# Patient Record
Sex: FEMALE | Race: WHITE | NOT HISPANIC OR LATINO | Employment: FULL TIME | URBAN - METROPOLITAN AREA
[De-identification: names, ages, dates, MRNs, and addresses within clinical notes are randomized per-mention and may not be internally consistent; named-entity substitution may affect disease eponyms.]

---

## 2019-08-19 RX ORDER — PRAVASTATIN SODIUM 20 MG/1
20 TABLET ORAL DAILY
Qty: 90 TABLET | Refills: 0 | Status: SHIPPED | OUTPATIENT
Start: 2019-08-19 | End: 2020-01-20 | Stop reason: SDUPTHER

## 2019-08-19 RX ORDER — PRAVASTATIN SODIUM 20 MG/1
20 TABLET ORAL DAILY
COMMUNITY
End: 2019-08-19 | Stop reason: SDUPTHER

## 2019-08-19 NOTE — TELEPHONE ENCOUNTER
----- Message from Swapna Gaspar sent at 8/19/2019 10:17 AM CDT -----  Contact: April Shanti  Former pt. of provider at [facility] Pt. Is requesting refill for Pravastatin 20mg. Pt uses [facility] pharmacy on [address], pharmacy number [phone]. Pt best contact number is [phone].    Thank you,   [employer]

## 2019-09-16 ENCOUNTER — TELEPHONE (OUTPATIENT)
Dept: FAMILY MEDICINE | Facility: CLINIC | Age: 54
End: 2019-09-16

## 2019-09-16 NOTE — TELEPHONE ENCOUNTER
Patient aware, she needs appt last seen in May. She is scheduled for Oct. She should have 60 tablets remaining she is checking with [facility] now

## 2019-09-16 NOTE — TELEPHONE ENCOUNTER
----- Message from Swapna Gaspar sent at 9/16/2019 10:22 AM CDT -----  Regarding: Refill Request  Contact: [phone]  Good Morning,    This pt is requesting a refill on  Pravastatin 20mg .  Pt uses [facility] on [address], [place]. Pharmacy number [phone].  The pt's best contact number is [phone].     Thank you,   [facility]

## 2019-09-16 NOTE — TELEPHONE ENCOUNTER
----- Message from Swapna Gaspar sent at 9/16/2019 10:22 AM CDT -----  Regarding: Refill Request  Contact: [phone]  Good Morning,    This pt is requesting a refill on  Pravastatin 20mg .  Pt uses [facility] on [address], [place]. Pharmacy number [phone].  The pt's best contact number is [phone].     Thank you,   [facility] [employer]

## 2019-10-22 ENCOUNTER — OFFICE VISIT (OUTPATIENT)
Dept: FAMILY MEDICINE | Facility: CLINIC | Age: 54
End: 2019-10-22
Payer: COMMERCIAL

## 2019-10-22 VITALS
SYSTOLIC BLOOD PRESSURE: 142 MMHG | TEMPERATURE: 98 F | WEIGHT: 155.44 LBS | DIASTOLIC BLOOD PRESSURE: 90 MMHG | HEIGHT: 66 IN | BODY MASS INDEX: 24.98 KG/M2 | HEART RATE: 73 BPM | OXYGEN SATURATION: 99 %

## 2019-10-22 DIAGNOSIS — G43.909 MIGRAINE WITHOUT STATUS MIGRAINOSUS, NOT INTRACTABLE, UNSPECIFIED MIGRAINE TYPE: ICD-10-CM

## 2019-10-22 DIAGNOSIS — E78.2 HYPERLIPIDEMIA, MIXED: Primary | ICD-10-CM

## 2019-10-22 DIAGNOSIS — F33.0 MILD RECURRENT MAJOR DEPRESSION: ICD-10-CM

## 2019-10-22 DIAGNOSIS — M17.0 PRIMARY OSTEOARTHRITIS OF BOTH KNEES: ICD-10-CM

## 2019-10-22 DIAGNOSIS — Z00.00 ANNUAL PHYSICAL EXAM: ICD-10-CM

## 2019-10-22 DIAGNOSIS — F41.9 ANXIETY: ICD-10-CM

## 2019-10-22 PROCEDURE — 99214 PR OFFICE/OUTPT VISIT, EST, LEVL IV, 30-39 MIN: ICD-10-PCS | Mod: S$GLB,,, | Performed by: INTERNAL MEDICINE

## 2019-10-22 PROCEDURE — 99999 PR PBB SHADOW E&M-EST. PATIENT-LVL III: ICD-10-PCS | Mod: PBBFAC,,, | Performed by: INTERNAL MEDICINE

## 2019-10-22 PROCEDURE — 99999 PR PBB SHADOW E&M-EST. PATIENT-LVL III: CPT | Mod: PBBFAC,,, | Performed by: INTERNAL MEDICINE

## 2019-10-22 PROCEDURE — 99214 OFFICE O/P EST MOD 30 MIN: CPT | Mod: S$GLB,,, | Performed by: INTERNAL MEDICINE

## 2019-10-22 RX ORDER — CITALOPRAM 20 MG/1
20 TABLET, FILM COATED ORAL DAILY
Refills: 1 | COMMUNITY
Start: 2019-08-11 | End: 2020-01-20 | Stop reason: SDUPTHER

## 2019-10-22 RX ORDER — BUSPIRONE HYDROCHLORIDE 15 MG/1
15 TABLET ORAL 2 TIMES DAILY
Qty: 60 TABLET | Refills: 5 | Status: SHIPPED | OUTPATIENT
Start: 2019-10-22 | End: 2019-10-30

## 2019-10-22 NOTE — LETTER
October 22, 2019      [address], [address]  [place] [address]  Phone: [phone]  Fax: [phone]       Patient: Jane Moser   YOB: 1965  Date of Visit: 10/22/2019    To Whom It May Concern:    Wendy Moser  was at [facility] on 10/22/2019. She may return to work/school on 10/22/2019  with no restrictions. If you have any questions or concerns, or if I can be of further assistance, please do not hesitate to contact me.    Sincerely,        Clarke Almendarez MD

## 2019-10-22 NOTE — PROGRESS NOTES
[facility] Note    Chief Complaint      Chief Complaint   Patient presents with    Establish Care       History of Present Illness      Jane Moser is a 54 y.o. female with chronic conditions of HLD, migraines, osteoarthritis, knees, anxiety who presents today for: re-establish care from  and review chronic conditions.   HLD: Controlled on pravastatin.  LDL due.    Anxiety: Still significnat symptoms.  Tried increasing celexa but felt like a zombie.  Wants to try something else.  Osteoarthritis, knees: Needs refill of meloxicam.  Was doing well to control symptoms.  Taking 5x/week.  Previously followed by ortho who did not recommend surgery at that time.  Also saw Dr. Rose and offered knee injection if it gets severe.  Migraines: Stable frequency and severity.      Past Medical History:  History reviewed. No pertinent past medical history.    Past Surgical History:   has no past surgical history on file.    Family History:  family history includes Aneurysm in her father; No Known Problems in her brother, mother, and sister.     Social History:  Social History     Tobacco Use    Smoking status: Never Smoker   Substance Use Topics    Alcohol use: Yes     Alcohol/week: 1.0 standard drinks     Types: 1 Glasses of wine per week     Frequency: 2-4 times a month     Drinks per session: 1 or 2     Binge frequency: Never    Drug use: Never       Review of Systems   Constitutional: Negative for chills, fever and malaise/fatigue.   HENT: Negative for hearing loss.    Eyes: Negative for discharge.   Respiratory: Negative for shortness of breath and wheezing.    Cardiovascular: Negative for chest pain and palpitations.   Gastrointestinal: Positive for constipation. Negative for blood in stool, diarrhea, nausea and vomiting.   Genitourinary: Negative for dysuria and hematuria.   Musculoskeletal: Negative for neck pain.   Skin: Negative for rash.   Neurological: Positive for headaches. Negative for  "weakness.   Endo/Heme/Allergies: Negative for polydipsia.        Medications:  Outpatient Encounter Medications as of 10/22/2019   Medication Sig Dispense Refill    citalopram (CELEXA) 20 MG tablet Take 20 mg by mouth once daily.  1    pravastatin (PRAVACHOL) 20 MG tablet Take 1 tablet (20 mg total) by mouth once daily. 90 tablet 0    busPIRone (BUSPAR) 15 MG tablet Take 1 tablet (15 mg total) by mouth 2 (two) times daily. 60 tablet 5    meloxicam 15 mg TbDL        No facility-administered encounter medications on file as of 10/22/2019.        Allergies:  Review of patient's allergies indicates:   Allergen Reactions    Cefdinir Diarrhea and Nausea And Vomiting       Health Maintenance:    There is no immunization history on file for this patient.   Health Maintenance   Topic Date Due    Lipid Panel  1965    TETANUS VACCINE  04/16/1985    Pap Smear with HPV Cotest  04/16/1988    Mammogram  04/16/2007    Colonoscopy  04/16/2017      FLu shot scheduled next week.  Td 2005.  Shingles vaccine due age 62.  Pneumonia vaccine due age 67.  Mammogram UTD.  PAP smear UTD.  Established with Dr. Agarwal now.  DEXA due age 67.  Cscope 2017 with Dr. Enriquez, no polyps, 10 yr interval.    Physical Exam      Vital Signs  Temp: 98 °F (36.7 °C)  Temp src: Oral  Pulse: 73  SpO2: 99 %  BP: (!) 142/90  BP Location: Right arm  Patient Position: Sitting  Height and Weight  Height: 5' 6" (167.6 cm)  Weight: 70.5 kg (155 lb 6.8 oz)  BSA (Calculated - sq m): 1.81 sq meters  BMI (Calculated): 25.1  Weight in (lb) to have BMI = 25: 154.6]    Physical Exam   Constitutional: She appears well-developed and well-nourished.   HENT:   Head: Normocephalic and atraumatic.   Right Ear: External ear normal.   Left Ear: External ear normal.   Mouth/Throat: Oropharynx is clear and moist.   Eyes: Pupils are equal, round, and reactive to light. Conjunctivae and EOM are normal.   Neck: Carotid bruit is not present.   Cardiovascular: Normal " rate, regular rhythm, normal heart sounds and intact distal pulses.   No murmur heard.  Pulmonary/Chest: Effort normal and breath sounds normal. She has no wheezes. She has no rales.   Abdominal: Soft. Bowel sounds are normal. She exhibits no distension. There is no hepatosplenomegaly. There is no tenderness.   Musculoskeletal: She exhibits no edema.   Vitals reviewed.       Laboratory:  CBC:      CMP:        Invalid input(s): CREATININ  URINALYSIS:       LIPIDS:      TSH:      A1C:        Assessment/Plan     April Shanti is a 54 y.o.female with:    1. Hyperlipidemia, mixed  - Comprehensive metabolic panel; Future  - Lipid panel; Future  - CBC auto differential; Future  - TSH; Future  - T4, free; Future  - Comprehensive metabolic panel  - Lipid panel  - CBC auto differential  - TSH  - T4, free    2. Mild recurrent major depression    3. Anxiety  - busPIRone (BUSPAR) 15 MG tablet; Take 1 tablet (15 mg total) by mouth 2 (two) times daily.  Dispense: 60 tablet; Refill: 5  - Comprehensive metabolic panel; Future  - Lipid panel; Future  - CBC auto differential; Future  - TSH; Future  - T4, free; Future  - Comprehensive metabolic panel  - Lipid panel  - CBC auto differential  - TSH  - T4, free    4. Primary osteoarthritis of both knees    5. Migraine without status migrainosus, not intractable, unspecified migraine type    6. Annual physical exam  - Comprehensive metabolic panel; Future  - Lipid panel; Future  - CBC auto differential; Future  - TSH; Future  - T4, free; Future  - Comprehensive metabolic panel  - Lipid panel  - CBC auto differential  - TSH  - T4, free       Chronic conditions status updated as per HPI.  Other than changes above, cont current medications and maintain follow up with specialists.  Return to clinic in 6 months.    Clarke Almendarez MD  [facility]                Answers for HPI/ROS submitted by the patient on 10/17/2019   activity change: No  unexpected weight change: No  rhinorrhea:  No  trouble swallowing: No  visual disturbance: No  chest tightness: No  polyuria: No  difficulty urinating: No  menstrual problem: No  joint swelling: No  arthralgias: Yes  confusion: No  dysphoric mood: No

## 2019-10-29 ENCOUNTER — PATIENT MESSAGE (OUTPATIENT)
Dept: FAMILY MEDICINE | Facility: CLINIC | Age: 54
End: 2019-10-29

## 2019-10-31 DIAGNOSIS — M17.0 PRIMARY OSTEOARTHRITIS OF BOTH KNEES: Primary | ICD-10-CM

## 2019-11-05 ENCOUNTER — CLINICAL SUPPORT (OUTPATIENT)
Dept: FAMILY MEDICINE | Facility: CLINIC | Age: 54
End: 2019-11-05
Payer: COMMERCIAL

## 2019-11-05 VITALS — DIASTOLIC BLOOD PRESSURE: 90 MMHG | SYSTOLIC BLOOD PRESSURE: 140 MMHG

## 2019-11-05 DIAGNOSIS — I10 ESSENTIAL HYPERTENSION: Primary | ICD-10-CM

## 2019-11-05 PROCEDURE — 99999 PR PBB SHADOW E&M-EST. PATIENT-LVL I: CPT | Mod: PBBFAC,,,

## 2019-11-05 PROCEDURE — 99999 PR PBB SHADOW E&M-EST. PATIENT-LVL I: ICD-10-PCS | Mod: PBBFAC,,,

## 2019-11-05 RX ORDER — AMLODIPINE BESYLATE 5 MG/1
5 TABLET ORAL DAILY
Qty: 30 TABLET | Refills: 3 | Status: SHIPPED | OUTPATIENT
Start: 2019-11-05 | End: 2020-03-11 | Stop reason: SDUPTHER

## 2019-11-05 NOTE — PROGRESS NOTES
Patient in office this morning for BP Check(140 90) patient report she is not on no BP meds patient was seen on 10/15 with Dr Almendarez BP was 142/90 in office she was told to come back for nurse visit for BP Check. Patient states her BP at home be in the 140s/90s.

## 2019-11-17 ENCOUNTER — PATIENT MESSAGE (OUTPATIENT)
Dept: FAMILY MEDICINE | Facility: CLINIC | Age: 54
End: 2019-11-17

## 2019-11-19 ENCOUNTER — CLINICAL SUPPORT (OUTPATIENT)
Dept: FAMILY MEDICINE | Facility: CLINIC | Age: 54
End: 2019-11-19
Payer: COMMERCIAL

## 2019-11-19 VITALS — HEART RATE: 71 BPM | SYSTOLIC BLOOD PRESSURE: 108 MMHG | DIASTOLIC BLOOD PRESSURE: 70 MMHG

## 2019-11-19 PROCEDURE — 99999 PR PBB SHADOW E&M-EST. PATIENT-LVL II: CPT | Mod: PBBFAC,,,

## 2019-11-19 PROCEDURE — 99999 PR PBB SHADOW E&M-EST. PATIENT-LVL II: ICD-10-PCS | Mod: PBBFAC,,,

## 2019-11-20 ENCOUNTER — TELEPHONE (OUTPATIENT)
Dept: FAMILY MEDICINE | Facility: CLINIC | Age: 54
End: 2019-11-20

## 2019-12-06 ENCOUNTER — TELEPHONE (OUTPATIENT)
Dept: ADMINISTRATIVE | Facility: HOSPITAL | Age: 54
End: 2019-12-06

## 2019-12-06 ENCOUNTER — PATIENT OUTREACH (OUTPATIENT)
Dept: ADMINISTRATIVE | Facility: HOSPITAL | Age: 54
End: 2019-12-06

## 2020-01-19 ENCOUNTER — PATIENT MESSAGE (OUTPATIENT)
Dept: FAMILY MEDICINE | Facility: CLINIC | Age: 55
End: 2020-01-19

## 2020-01-20 RX ORDER — PRAVASTATIN SODIUM 20 MG/1
20 TABLET ORAL DAILY
Qty: 90 TABLET | Refills: 3 | Status: SHIPPED | OUTPATIENT
Start: 2020-01-20 | End: 2021-01-25

## 2020-01-20 RX ORDER — CITALOPRAM 20 MG/1
20 TABLET, FILM COATED ORAL DAILY
Qty: 90 TABLET | Refills: 3 | Status: SHIPPED | OUTPATIENT
Start: 2020-01-20 | End: 2021-06-13 | Stop reason: SDUPTHER

## 2020-03-11 DIAGNOSIS — I10 ESSENTIAL HYPERTENSION: ICD-10-CM

## 2020-03-11 RX ORDER — AMLODIPINE BESYLATE 5 MG/1
5 TABLET ORAL DAILY
Qty: 90 TABLET | Refills: 3 | Status: SHIPPED | OUTPATIENT
Start: 2020-03-11 | End: 2021-02-26

## 2020-04-22 ENCOUNTER — OFFICE VISIT (OUTPATIENT)
Dept: FAMILY MEDICINE | Facility: CLINIC | Age: 55
End: 2020-04-22
Payer: COMMERCIAL

## 2020-04-22 DIAGNOSIS — M17.0 PRIMARY OSTEOARTHRITIS OF BOTH KNEES: ICD-10-CM

## 2020-04-22 DIAGNOSIS — G43.909 MIGRAINE WITHOUT STATUS MIGRAINOSUS, NOT INTRACTABLE, UNSPECIFIED MIGRAINE TYPE: ICD-10-CM

## 2020-04-22 DIAGNOSIS — F41.9 ANXIETY: ICD-10-CM

## 2020-04-22 DIAGNOSIS — Z00.00 ANNUAL PHYSICAL EXAM: ICD-10-CM

## 2020-04-22 DIAGNOSIS — E78.2 HYPERLIPIDEMIA, MIXED: ICD-10-CM

## 2020-04-22 DIAGNOSIS — I10 ESSENTIAL HYPERTENSION: Primary | ICD-10-CM

## 2020-04-22 PROCEDURE — 99214 PR OFFICE/OUTPT VISIT, EST, LEVL IV, 30-39 MIN: ICD-10-PCS | Mod: 95,,, | Performed by: INTERNAL MEDICINE

## 2020-04-22 PROCEDURE — 99214 OFFICE O/P EST MOD 30 MIN: CPT | Mod: 95,,, | Performed by: INTERNAL MEDICINE

## 2020-04-22 NOTE — PROGRESS NOTES
[facility] Virtual Visit Note    The patient location is: home  The chief complaint leading to consultation is: follow up chronic conditions  Visit type: Virtual visit with synchronous audio and video  Total time spent with patient: 20 min  Each patient to whom he or she provides medical services by telemedicine is:  (1) informed of the relationship between the physician and patient and the respective role of any other health care provider with respect to management of the patient; and (2) notified that he or she may decline to receive medical services by telemedicine and may withdraw from such care at any time.      Chief Complaint      Chief Complaint   Patient presents with    Follow-up       History of Present Illness      April Shanti is a 55 y.o. female with chronic conditions of HTN, HLD, anxiety, osteoarthritis, migraine who presents today for: follow up chronic conditions.  HTN: BP at goal on amlodipine.  BP this morning 100/68.  HLD: Controlled on pravastatin.    Anxiety: Doing well on celexa.  Osteoarthritis, knees: Doing well on meloxicam as needed.  Symptoms not bothering her at this time.  Also saw Dr. Rose and offered knee injection if it gets severe.  Migraines: Slight increase in frequency 2/2 COVID stress.  Flu shot UTD.  Td 2005.  Shingles vaccine due age 62.  Pneumonia vaccine due age 67.  Mammogram UTD.  PAP smear UTD.  Scheduled with Dr. Agarwal.  DEXA due age 67.  Cscope 2017 with Dr. Enriquez, no polyps, 10 yr interval.    Past Medical History:  History reviewed. No pertinent past medical history.    Past Surgical History:   has a past surgical history that includes colonosocpy (04/18/2017).    Family History:  family history includes Aneurysm in her father; No Known Problems in her brother, mother, and sister.     Social History:  Social History     Tobacco Use    Smoking status: Never Smoker   Substance Use Topics    Alcohol use: Yes     Alcohol/week: 1.0 standard  drinks     Types: 1 Glasses of wine per week     Frequency: 2-4 times a month     Drinks per session: 1 or 2     Binge frequency: Never    Drug use: Never       Review of Systems   Constitutional: Negative for chills, fever and malaise/fatigue.   HENT: Negative for hearing loss.    Eyes: Negative for discharge.   Respiratory: Negative for shortness of breath and wheezing.    Cardiovascular: Negative for chest pain and palpitations.   Gastrointestinal: Negative for blood in stool, constipation, diarrhea, nausea and vomiting.   Genitourinary: Negative for dysuria and hematuria.   Musculoskeletal: Negative for neck pain.   Skin: Negative for rash.   Neurological: Negative for weakness and headaches.   Endo/Heme/Allergies: Negative for polydipsia.        Medications:  Outpatient Encounter Medications as of 4/22/2020   Medication Sig Dispense Refill    amLODIPine (NORVASC) 5 MG tablet Take 1 tablet (5 mg total) by mouth once daily. 90 tablet 3    citalopram (CELEXA) 20 MG tablet Take 1 tablet (20 mg total) by mouth once daily. 90 tablet 3    FLUCELVAX QUAD 3227-5818, PF, 60 mcg (15 mcg x 4)/0.5 mL Syrg TO BE ADMINISTERED BY PHARMACIST FOR IMMUNIZATION  0    pravastatin (PRAVACHOL) 20 MG tablet Take 1 tablet (20 mg total) by mouth once daily. 90 tablet 3     No facility-administered encounter medications on file as of 4/22/2020.        Allergies:  Review of patient's allergies indicates:   Allergen Reactions    Cefdinir Diarrhea and Nausea And Vomiting       Health Maintenance:  Immunization History   Administered Date(s) Administered    Influenza 10/23/2019    Influenza - Quadrivalent - MDCK - PF 10/23/2019      Health Maintenance   Topic Date Due    Lipid Panel  1965    TETANUS VACCINE  04/16/1985    Pap Smear with HPV Cotest  04/16/1988    Mammogram  05/31/2021    Colonoscopy  04/18/2027        Physical Exam       ]    Physical Exam   Constitutional: She is oriented to person, place, and time. She  appears well-developed and well-nourished. No distress.   Eyes: Conjunctivae and EOM are normal. Right eye exhibits no discharge. Left eye exhibits no discharge. No scleral icterus.   Pulmonary/Chest: Effort normal. No respiratory distress.   Neurological: She is alert and oriented to person, place, and time.   Skin: She is not diaphoretic.   Psychiatric: She has a normal mood and affect. Her behavior is normal. Judgment and thought content normal.        Laboratory:  CBC:      CMP:        Invalid input(s): CREATININ  URINALYSIS:       LIPIDS:      TSH:      A1C:        Assessment/Plan     April Shanti is a 55 y.o.female with:    1. Essential hypertension  - CBC auto differential; Future  - Comprehensive metabolic panel; Future  - Lipid panel; Future  - TSH; Future  - T4, free; Future  Continue current meds.    2. Hyperlipidemia, mixed  - CBC auto differential; Future  - Comprehensive metabolic panel; Future  - Lipid panel; Future  - TSH; Future  - T4, free; Future  Continue current meds.  Update labs.  3. Migraine without status migrainosus, not intractable, unspecified migraine type  Continue current meds.    4. Anxiety  Cont to monitor  5. Primary osteoarthritis of both knees  Continue current meds.      Chronic conditions status updated as per HPI.  Other than changes above, cont current medications and maintain follow up with specialists.  Return to clinic in 6 months.    Clarke Almendarez MD  [facility]                Answers for HPI/ROS submitted by the patient on 4/15/2020   activity change: No  unexpected weight change: No  rhinorrhea: No  trouble swallowing: No  visual disturbance: No  chest tightness: No  polyuria: No  difficulty urinating: No  menstrual problem: No  joint swelling: No  arthralgias: No  confusion: No  dysphoric mood: No

## 2020-04-25 LAB
ALBUMIN SERPL-MCNC: 4.5 G/DL (ref 3.6–5.1)
ALBUMIN/GLOB SERPL: 1.7 (CALC) (ref 1–2.5)
ALP SERPL-CCNC: 88 U/L (ref 37–153)
ALT SERPL-CCNC: 9 U/L (ref 6–29)
AST SERPL-CCNC: 15 U/L (ref 10–35)
BASOPHILS # BLD AUTO: 50 CELLS/UL (ref 0–200)
BASOPHILS NFR BLD AUTO: 0.8 %
BILIRUB SERPL-MCNC: 1 MG/DL (ref 0.2–1.2)
BUN SERPL-MCNC: 15 MG/DL (ref 7–25)
BUN/CREAT SERPL: NORMAL (CALC) (ref 6–22)
CALCIUM SERPL-MCNC: 9.7 MG/DL (ref 8.6–10.4)
CHLORIDE SERPL-SCNC: 104 MMOL/L (ref 98–110)
CHOLEST SERPL-MCNC: 209 MG/DL
CHOLEST/HDLC SERPL: 3.3 (CALC)
CO2 SERPL-SCNC: 30 MMOL/L (ref 20–32)
CREAT SERPL-MCNC: 0.98 MG/DL (ref 0.5–1.05)
EOSINOPHIL # BLD AUTO: 242 CELLS/UL (ref 15–500)
EOSINOPHIL NFR BLD AUTO: 3.9 %
ERYTHROCYTE [DISTWIDTH] IN BLOOD BY AUTOMATED COUNT: 12.8 % (ref 11–15)
GFRSERPLBLD MDRD-ARVRAT: 66 ML/MIN/1.73M2
GLOBULIN SER CALC-MCNC: 2.6 G/DL (CALC) (ref 1.9–3.7)
GLUCOSE SERPL-MCNC: 92 MG/DL (ref 65–99)
HCT VFR BLD AUTO: 39 % (ref 35–45)
HDLC SERPL-MCNC: 63 MG/DL
HGB BLD-MCNC: 13.2 G/DL (ref 11.7–15.5)
LDLC SERPL CALC-MCNC: 116 MG/DL (CALC)
LYMPHOCYTES # BLD AUTO: 2089 CELLS/UL (ref 850–3900)
LYMPHOCYTES NFR BLD AUTO: 33.7 %
MCH RBC QN AUTO: 29.7 PG (ref 27–33)
MCHC RBC AUTO-ENTMCNC: 33.8 G/DL (ref 32–36)
MCV RBC AUTO: 87.6 FL (ref 80–100)
MONOCYTES # BLD AUTO: 676 CELLS/UL (ref 200–950)
MONOCYTES NFR BLD AUTO: 10.9 %
NEUTROPHILS # BLD AUTO: 3143 CELLS/UL (ref 1500–7800)
NEUTROPHILS NFR BLD AUTO: 50.7 %
NONHDLC SERPL-MCNC: 146 MG/DL (CALC)
PLATELET # BLD AUTO: 289 THOUSAND/UL (ref 140–400)
PMV BLD REES-ECKER: 9.9 FL (ref 7.5–12.5)
POTASSIUM SERPL-SCNC: 4.1 MMOL/L (ref 3.5–5.3)
PROT SERPL-MCNC: 7.1 G/DL (ref 6.1–8.1)
RBC # BLD AUTO: 4.45 MILLION/UL (ref 3.8–5.1)
SODIUM SERPL-SCNC: 141 MMOL/L (ref 135–146)
T4 FREE SERPL-MCNC: 1 NG/DL (ref 0.8–1.8)
TRIGL SERPL-MCNC: 189 MG/DL
TSH SERPL-ACNC: 2.29 MIU/L
WBC # BLD AUTO: 6.2 THOUSAND/UL (ref 3.8–10.8)

## 2020-04-28 ENCOUNTER — TELEPHONE (OUTPATIENT)
Dept: ADMINISTRATIVE | Facility: HOSPITAL | Age: 55
End: 2020-04-28

## 2020-04-28 ENCOUNTER — PATIENT OUTREACH (OUTPATIENT)
Dept: ADMINISTRATIVE | Facility: HOSPITAL | Age: 55
End: 2020-04-28

## 2020-05-06 ENCOUNTER — TELEPHONE (OUTPATIENT)
Dept: ADMINISTRATIVE | Facility: HOSPITAL | Age: 55
End: 2020-05-06

## 2020-05-06 ENCOUNTER — PATIENT OUTREACH (OUTPATIENT)
Dept: ADMINISTRATIVE | Facility: HOSPITAL | Age: 55
End: 2020-05-06

## 2020-08-27 ENCOUNTER — TELEPHONE (OUTPATIENT)
Dept: FAMILY MEDICINE | Facility: CLINIC | Age: 55
End: 2020-08-27

## 2020-08-27 NOTE — TELEPHONE ENCOUNTER
----- Message from Anahy Francois sent at 8/27/2020 11:13 AM CDT -----  Type:  Patient Returning Call    Who Called: April  Who Left Message for Patient: unknown  Does the patient know what this is regarding?: appt in October  Would the patient rather a call back or a response via [employer]? Call back  Best Call Back Number: [phone]  Additional Information: none

## 2020-10-16 LAB
ALBUMIN SERPL-MCNC: 4.6 G/DL (ref 3.6–5.1)
ALBUMIN/GLOB SERPL: 1.8 (CALC) (ref 1–2.5)
ALP SERPL-CCNC: 87 U/L (ref 37–153)
ALT SERPL-CCNC: 12 U/L (ref 6–29)
AST SERPL-CCNC: 16 U/L (ref 10–35)
BASOPHILS # BLD AUTO: 62 CELLS/UL (ref 0–200)
BASOPHILS NFR BLD AUTO: 1 %
BILIRUB SERPL-MCNC: 1.5 MG/DL (ref 0.2–1.2)
BUN SERPL-MCNC: 16 MG/DL (ref 7–25)
BUN/CREAT SERPL: ABNORMAL (CALC) (ref 6–22)
CALCIUM SERPL-MCNC: 9.7 MG/DL (ref 8.6–10.4)
CHLORIDE SERPL-SCNC: 105 MMOL/L (ref 98–110)
CHOLEST SERPL-MCNC: 189 MG/DL
CHOLEST/HDLC SERPL: 3 (CALC)
CO2 SERPL-SCNC: 28 MMOL/L (ref 20–32)
CREAT SERPL-MCNC: 0.95 MG/DL (ref 0.5–1.05)
EOSINOPHIL # BLD AUTO: 211 CELLS/UL (ref 15–500)
EOSINOPHIL NFR BLD AUTO: 3.4 %
ERYTHROCYTE [DISTWIDTH] IN BLOOD BY AUTOMATED COUNT: 13.1 % (ref 11–15)
GFRSERPLBLD MDRD-ARVRAT: 68 ML/MIN/1.73M2
GLOBULIN SER CALC-MCNC: 2.6 G/DL (CALC) (ref 1.9–3.7)
GLUCOSE SERPL-MCNC: 93 MG/DL (ref 65–99)
HCT VFR BLD AUTO: 38.8 % (ref 35–45)
HDLC SERPL-MCNC: 62 MG/DL
HGB BLD-MCNC: 12.7 G/DL (ref 11.7–15.5)
LDLC SERPL CALC-MCNC: 102 MG/DL (CALC)
LYMPHOCYTES # BLD AUTO: 1773 CELLS/UL (ref 850–3900)
LYMPHOCYTES NFR BLD AUTO: 28.6 %
MCH RBC QN AUTO: 29.1 PG (ref 27–33)
MCHC RBC AUTO-ENTMCNC: 32.7 G/DL (ref 32–36)
MCV RBC AUTO: 88.8 FL (ref 80–100)
MONOCYTES # BLD AUTO: 744 CELLS/UL (ref 200–950)
MONOCYTES NFR BLD AUTO: 12 %
NEUTROPHILS # BLD AUTO: 3410 CELLS/UL (ref 1500–7800)
NEUTROPHILS NFR BLD AUTO: 55 %
NONHDLC SERPL-MCNC: 127 MG/DL (CALC)
PLATELET # BLD AUTO: 267 THOUSAND/UL (ref 140–400)
PMV BLD REES-ECKER: 10.3 FL (ref 7.5–12.5)
POTASSIUM SERPL-SCNC: 4.1 MMOL/L (ref 3.5–5.3)
PROT SERPL-MCNC: 7.2 G/DL (ref 6.1–8.1)
RBC # BLD AUTO: 4.37 MILLION/UL (ref 3.8–5.1)
SODIUM SERPL-SCNC: 141 MMOL/L (ref 135–146)
T4 FREE SERPL-MCNC: 1.2 NG/DL (ref 0.8–1.8)
TRIGL SERPL-MCNC: 155 MG/DL
TSH SERPL-ACNC: 1.92 MIU/L
WBC # BLD AUTO: 6.2 THOUSAND/UL (ref 3.8–10.8)

## 2020-10-21 ENCOUNTER — TELEPHONE (OUTPATIENT)
Dept: ADMINISTRATIVE | Facility: HOSPITAL | Age: 55
End: 2020-10-21

## 2020-10-21 ENCOUNTER — OFFICE VISIT (OUTPATIENT)
Dept: FAMILY MEDICINE | Facility: CLINIC | Age: 55
End: 2020-10-21
Payer: COMMERCIAL

## 2020-10-21 VITALS
WEIGHT: 155.88 LBS | BODY MASS INDEX: 25.05 KG/M2 | TEMPERATURE: 98 F | SYSTOLIC BLOOD PRESSURE: 122 MMHG | OXYGEN SATURATION: 98 % | HEART RATE: 65 BPM | HEIGHT: 66 IN | DIASTOLIC BLOOD PRESSURE: 70 MMHG

## 2020-10-21 DIAGNOSIS — E78.2 HYPERLIPIDEMIA, MIXED: ICD-10-CM

## 2020-10-21 DIAGNOSIS — M17.0 PRIMARY OSTEOARTHRITIS OF BOTH KNEES: ICD-10-CM

## 2020-10-21 DIAGNOSIS — G43.909 MIGRAINE WITHOUT STATUS MIGRAINOSUS, NOT INTRACTABLE, UNSPECIFIED MIGRAINE TYPE: ICD-10-CM

## 2020-10-21 DIAGNOSIS — F41.9 ANXIETY: ICD-10-CM

## 2020-10-21 DIAGNOSIS — Z00.00 ANNUAL PHYSICAL EXAM: Primary | ICD-10-CM

## 2020-10-21 DIAGNOSIS — M85.80 OSTEOPENIA, UNSPECIFIED LOCATION: ICD-10-CM

## 2020-10-21 DIAGNOSIS — I10 ESSENTIAL HYPERTENSION: ICD-10-CM

## 2020-10-21 PROCEDURE — 99396 PR PREVENTIVE VISIT,EST,40-64: ICD-10-PCS | Mod: S$GLB,,, | Performed by: INTERNAL MEDICINE

## 2020-10-21 PROCEDURE — 99396 PREV VISIT EST AGE 40-64: CPT | Mod: S$GLB,,, | Performed by: INTERNAL MEDICINE

## 2020-10-21 PROCEDURE — 99999 PR PBB SHADOW E&M-EST. PATIENT-LVL IV: ICD-10-PCS | Mod: PBBFAC,,, | Performed by: INTERNAL MEDICINE

## 2020-10-21 PROCEDURE — 99999 PR PBB SHADOW E&M-EST. PATIENT-LVL IV: CPT | Mod: PBBFAC,,, | Performed by: INTERNAL MEDICINE

## 2020-10-21 RX ORDER — DOXYCYCLINE 100 MG/1
CAPSULE ORAL
COMMUNITY
Start: 2020-10-20 | End: 2023-07-10

## 2020-10-21 RX ORDER — ERGOCALCIFEROL 1.25 MG/1
CAPSULE ORAL
COMMUNITY
Start: 2020-08-21 | End: 2023-02-13 | Stop reason: SDUPTHER

## 2020-10-21 NOTE — PROGRESS NOTES
[facility] Note    Chief Complaint      Chief Complaint   Patient presents with    Annual Exam       History of Present Illness      Jane Moser is a 55 y.o. female with chronic conditions of HTN, HLD, anxiety, osteaorthritis, migraines who presents today for: annual preventative visit.  Currently on doxycycline from Dr. Agarwal for skin abscess in groin.  HTN: BP at goal on amlodipine.   HLD: Controlled on pravastatin.    Anxiety: Doing well on celexa. No new or worsening symptoms.    Osteoarthritis, knees: Doing well on meloxicam as needed.  Does have some soreness with walking/hiking.  Will be calling Dr. Heaton for injection.  Migraines: Doing well.  No recent exacerbation  Diet: Prepares own food mostly.  Limiting fatty foods and carbs.  Drinks plenty water  Exercise: Walks regularly.    Denies drinking and driving, drinking more than 4 drinks on occasion, drug use.     Flu shot UTD.  TdAP due but on doxycycline so will defer today.  Shingles vaccine discussed.  Pneumonia vaccine due age 67.  Mammogram UTD 2020.  PAP smear UTD 2019.  Scheduled with Dr. Agarwal.  DEXA 2020, osteopenia, established with Dr. Mondragon.  Cscope 2017 with Dr. Enriquez, no polyps, 10 yr interval.    Past Medical History:  History reviewed. No pertinent past medical history.    Past Surgical History:   has a past surgical history that includes colonosocpy (04/18/2017).    Family History:  family history includes Aneurysm in her father; No Known Problems in her brother, mother, and sister.     Social History:  Social History     Tobacco Use    Smoking status: Never Smoker   Substance Use Topics    Alcohol use: Yes     Alcohol/week: 1.0 standard drinks     Types: 1 Glasses of wine per week     Frequency: 2-4 times a month     Drinks per session: 1 or 2     Binge frequency: Never    Drug use: Never       I personally reviewed all past medical, surgical, social and family history.    Review of Systems  "  Constitutional: Negative for chills, fever and malaise/fatigue.   Respiratory: Negative for shortness of breath.    Cardiovascular: Negative for chest pain.   Gastrointestinal: Negative for constipation, diarrhea, nausea and vomiting.   Skin: Negative for rash.   Neurological: Negative for weakness.   All other systems reviewed and are negative.       Medications:  Outpatient Encounter Medications as of 10/21/2020   Medication Sig Dispense Refill    amLODIPine (NORVASC) 5 MG tablet Take 1 tablet (5 mg total) by mouth once daily. 90 tablet 3    citalopram (CELEXA) 20 MG tablet Take 1 tablet (20 mg total) by mouth once daily. 90 tablet 3    doxycycline (VIBRAMYCIN) 100 MG Cap       ergocalciferol (ERGOCALCIFEROL) 50,000 unit Cap TAKE ONE PILL ONCE A MONTH      pravastatin (PRAVACHOL) 20 MG tablet Take 1 tablet (20 mg total) by mouth once daily. 90 tablet 3    FLUCELVAX QUAD 8096-2724, PF, 60 mcg (15 mcg x 4)/0.5 mL Syrg TO BE ADMINISTERED BY PHARMACIST FOR IMMUNIZATION  0     No facility-administered encounter medications on file as of 10/21/2020.        Allergies:  Review of patient's allergies indicates:   Allergen Reactions    Cefdinir Diarrhea and Nausea And Vomiting       Health Maintenance:  Immunization History   Administered Date(s) Administered    Influenza 10/23/2019, 09/26/2020    Influenza - Quadrivalent - MDCK - PF 10/23/2019    Influenza - Quadrivalent - PF *Preferred* (6 months and older) 09/26/2020      Health Maintenance   Topic Date Due    Hepatitis C Screening  1965    TETANUS VACCINE  04/16/1985    Mammogram  05/31/2021    Lipid Panel  10/15/2025        Physical Exam      Vital Signs  Temp: 97.5 °F (36.4 °C)  Temp src: Oral  Pulse: 65  SpO2: 98 %  BP: 122/70  BP Location: Left arm  Patient Position: Sitting  Pain Score: 0-No pain  Height and Weight  Height: 5' 6" (167.6 cm)  Weight: 70.7 kg (155 lb 13.8 oz)  BSA (Calculated - sq m): 1.81 sq meters  BMI (Calculated): " 25.2  Weight in (lb) to have BMI = 25: 154.6]    Physical Exam  Vitals signs reviewed.   Constitutional:       Appearance: She is well-developed.   HENT:      Head: Normocephalic and atraumatic.      Right Ear: External ear normal.      Left Ear: External ear normal.   Eyes:      Conjunctiva/sclera: Conjunctivae normal.      Pupils: Pupils are equal, round, and reactive to light.   Neck:      Vascular: No carotid bruit.   Cardiovascular:      Rate and Rhythm: Normal rate and regular rhythm.      Heart sounds: Normal heart sounds. No murmur.   Pulmonary:      Effort: Pulmonary effort is normal.      Breath sounds: Normal breath sounds. No wheezing or rales.   Abdominal:      General: Bowel sounds are normal. There is no distension.      Palpations: Abdomen is soft.      Tenderness: There is no abdominal tenderness.          Laboratory:  CBC:  Recent Labs   Lab 04/24/20  0736 10/15/20  0734   WBC 6.2 6.2   RBC 4.45 4.37   Hemoglobin 13.2 12.7   Hematocrit 39.0 38.8   Platelets 289 267   Mean Corpuscular Volume 87.6 88.8   Mean Corpuscular Hemoglobin 29.7 29.1   Mean Corpuscular Hemoglobin Conc 33.8 32.7     CMP:  Recent Labs   Lab 04/24/20 0736 10/15/20  0734   Glucose 92 93   Calcium 9.7 9.7   Albumin 4.5 4.6   Total Protein 7.1 7.2   Sodium 141 141   Potassium 4.1 4.1   CO2 30 28   Chloride 104 105   BUN, Bld 15 16   Alkaline Phosphatase 88  --    ALT 9 12   AST 15 16   Total Bilirubin 1.0 1.5 H     URINALYSIS:       LIPIDS:  Recent Labs   Lab 04/24/20 0736 10/15/20  0734   TSH 2.29 1.92   HDL 63 62   Cholesterol 209 H 189   Triglycerides 189 H 155 H   LDL Cholesterol 116 H 102 H   Hdl/Cholesterol Ratio 3.3 3.0   Non HDL Chol. (LDL+VLDL) 146 H 127     TSH:  Recent Labs   Lab 04/24/20 0736 10/15/20  0734   TSH 2.29 1.92     A1C:        Assessment/Plan     Jane Moser is a 55 y.o.female with:    1. Annual physical exam  Discussed diet and exercise, vaccines and cancer screening, risk factors.  Screening labs  reviewed.  2. Essential hypertension  Continue current meds.    3. Hyperlipidemia, mixed  Continue current meds.  Reviewed labs  4. Anxiety  Continue current meds.    5. Primary osteoarthritis of both knees  Continue current meds.  F/U with Dr. Heaton as needed  6. Migraine without status migrainosus, not intractable, unspecified migraine type  Doing well with no recent exacerbation.  7. Osteopenia  F/U with Dr. Mondragon    Chronic conditions status updated as per HPI.  Other than changes above, cont current medications and maintain follow up with specialists.  Return to clinic in 6 months.    Clarke Almendarez MD  [facility]

## 2020-11-13 ENCOUNTER — PATIENT OUTREACH (OUTPATIENT)
Dept: ADMINISTRATIVE | Facility: HOSPITAL | Age: 55
End: 2020-11-13

## 2020-11-13 ENCOUNTER — TELEPHONE (OUTPATIENT)
Dept: ADMINISTRATIVE | Facility: HOSPITAL | Age: 55
End: 2020-11-13

## 2020-11-16 RX ORDER — MELOXICAM 15 MG/1
15 TABLET ORAL DAILY
Qty: 30 TABLET | Refills: 1 | Status: SHIPPED | OUTPATIENT
Start: 2020-11-16 | End: 2021-01-15

## 2021-01-15 RX ORDER — MELOXICAM 15 MG/1
TABLET ORAL
Qty: 30 TABLET | Refills: 1 | Status: SHIPPED | OUTPATIENT
Start: 2021-01-15 | End: 2021-03-31 | Stop reason: SDUPTHER

## 2021-01-25 RX ORDER — PRAVASTATIN SODIUM 20 MG/1
TABLET ORAL
Qty: 90 TABLET | Refills: 2 | Status: SHIPPED | OUTPATIENT
Start: 2021-01-25 | End: 2021-10-19

## 2021-02-26 DIAGNOSIS — I10 ESSENTIAL HYPERTENSION: ICD-10-CM

## 2021-02-26 RX ORDER — AMLODIPINE BESYLATE 5 MG/1
TABLET ORAL
Qty: 90 TABLET | Refills: 2 | Status: SHIPPED | OUTPATIENT
Start: 2021-02-26 | End: 2021-11-21

## 2021-03-22 ENCOUNTER — PATIENT MESSAGE (OUTPATIENT)
Dept: FAMILY MEDICINE | Facility: CLINIC | Age: 56
End: 2021-03-22

## 2021-03-22 DIAGNOSIS — I10 ESSENTIAL HYPERTENSION: ICD-10-CM

## 2021-03-22 DIAGNOSIS — Z11.4 SCREENING FOR HIV (HUMAN IMMUNODEFICIENCY VIRUS): ICD-10-CM

## 2021-03-22 DIAGNOSIS — Z11.59 SCREENING FOR VIRAL DISEASE: ICD-10-CM

## 2021-03-22 DIAGNOSIS — Z00.00 ANNUAL PHYSICAL EXAM: Primary | ICD-10-CM

## 2021-03-22 DIAGNOSIS — E78.2 HYPERLIPIDEMIA, MIXED: ICD-10-CM

## 2021-03-31 ENCOUNTER — PATIENT MESSAGE (OUTPATIENT)
Dept: FAMILY MEDICINE | Facility: CLINIC | Age: 56
End: 2021-03-31

## 2021-03-31 RX ORDER — MELOXICAM 15 MG/1
15 TABLET ORAL DAILY
Qty: 30 TABLET | Refills: 1 | Status: SHIPPED | OUTPATIENT
Start: 2021-03-31 | End: 2022-01-27

## 2021-04-14 ENCOUNTER — PATIENT MESSAGE (OUTPATIENT)
Dept: FAMILY MEDICINE | Facility: CLINIC | Age: 56
End: 2021-04-14

## 2021-04-15 LAB
ALBUMIN SERPL-MCNC: 4.4 G/DL (ref 3.6–5.1)
ALBUMIN/GLOB SERPL: 1.6 (CALC) (ref 1–2.5)
ALP SERPL-CCNC: 111 U/L (ref 37–153)
ALT SERPL-CCNC: 15 U/L (ref 6–29)
AST SERPL-CCNC: 17 U/L (ref 10–35)
BASOPHILS # BLD AUTO: 77 CELLS/UL (ref 0–200)
BASOPHILS NFR BLD AUTO: 1.2 %
BILIRUB SERPL-MCNC: 1 MG/DL (ref 0.2–1.2)
BUN SERPL-MCNC: 13 MG/DL (ref 7–25)
BUN/CREAT SERPL: NORMAL (CALC) (ref 6–22)
CALCIUM SERPL-MCNC: 9.7 MG/DL (ref 8.6–10.4)
CHLORIDE SERPL-SCNC: 104 MMOL/L (ref 98–110)
CHOLEST SERPL-MCNC: 218 MG/DL
CHOLEST/HDLC SERPL: 3.4 (CALC)
CO2 SERPL-SCNC: 27 MMOL/L (ref 20–32)
CREAT SERPL-MCNC: 0.92 MG/DL (ref 0.5–1.05)
EOSINOPHIL # BLD AUTO: 256 CELLS/UL (ref 15–500)
EOSINOPHIL NFR BLD AUTO: 4 %
ERYTHROCYTE [DISTWIDTH] IN BLOOD BY AUTOMATED COUNT: 13 % (ref 11–15)
GLOBULIN SER CALC-MCNC: 2.8 G/DL (CALC) (ref 1.9–3.7)
GLUCOSE SERPL-MCNC: 96 MG/DL (ref 65–99)
HCT VFR BLD AUTO: 39.8 % (ref 35–45)
HCV AB S/CO SERPL IA: 0.01
HCV AB SERPL QL IA: NORMAL
HDLC SERPL-MCNC: 64 MG/DL
HGB BLD-MCNC: 12.8 G/DL (ref 11.7–15.5)
HIV 1+2 AB+HIV1 P24 AG SERPL QL IA: NORMAL
LDLC SERPL CALC-MCNC: 125 MG/DL (CALC)
LYMPHOCYTES # BLD AUTO: 2483 CELLS/UL (ref 850–3900)
LYMPHOCYTES NFR BLD AUTO: 38.8 %
MCH RBC QN AUTO: 28.8 PG (ref 27–33)
MCHC RBC AUTO-ENTMCNC: 32.2 G/DL (ref 32–36)
MCV RBC AUTO: 89.4 FL (ref 80–100)
MONOCYTES # BLD AUTO: 627 CELLS/UL (ref 200–950)
MONOCYTES NFR BLD AUTO: 9.8 %
NEUTROPHILS # BLD AUTO: 2957 CELLS/UL (ref 1500–7800)
NEUTROPHILS NFR BLD AUTO: 46.2 %
NONHDLC SERPL-MCNC: 154 MG/DL (CALC)
PLATELET # BLD AUTO: 291 THOUSAND/UL (ref 140–400)
PMV BLD REES-ECKER: 10 FL (ref 7.5–12.5)
POTASSIUM SERPL-SCNC: 4.2 MMOL/L (ref 3.5–5.3)
PROT SERPL-MCNC: 7.2 G/DL (ref 6.1–8.1)
RBC # BLD AUTO: 4.45 MILLION/UL (ref 3.8–5.1)
SODIUM SERPL-SCNC: 139 MMOL/L (ref 135–146)
T4 FREE SERPL-MCNC: 0.9 NG/DL (ref 0.8–1.8)
TRIGL SERPL-MCNC: 175 MG/DL
TSH SERPL-ACNC: 3.11 MIU/L
WBC # BLD AUTO: 6.4 THOUSAND/UL (ref 3.8–10.8)

## 2021-04-29 ENCOUNTER — OFFICE VISIT (OUTPATIENT)
Dept: FAMILY MEDICINE | Facility: CLINIC | Age: 56
End: 2021-04-29
Payer: COMMERCIAL

## 2021-04-29 VITALS
WEIGHT: 161.81 LBS | DIASTOLIC BLOOD PRESSURE: 64 MMHG | HEART RATE: 66 BPM | BODY MASS INDEX: 26.01 KG/M2 | SYSTOLIC BLOOD PRESSURE: 104 MMHG | OXYGEN SATURATION: 98 % | TEMPERATURE: 98 F | HEIGHT: 66 IN

## 2021-04-29 DIAGNOSIS — I10 ESSENTIAL HYPERTENSION: Primary | ICD-10-CM

## 2021-04-29 DIAGNOSIS — M85.80 OSTEOPENIA, UNSPECIFIED LOCATION: ICD-10-CM

## 2021-04-29 DIAGNOSIS — E78.2 HYPERLIPIDEMIA, MIXED: ICD-10-CM

## 2021-04-29 DIAGNOSIS — F41.9 ANXIETY: ICD-10-CM

## 2021-04-29 DIAGNOSIS — F51.01 PRIMARY INSOMNIA: ICD-10-CM

## 2021-04-29 DIAGNOSIS — M17.0 PRIMARY OSTEOARTHRITIS OF BOTH KNEES: ICD-10-CM

## 2021-04-29 DIAGNOSIS — G43.909 MIGRAINE WITHOUT STATUS MIGRAINOSUS, NOT INTRACTABLE, UNSPECIFIED MIGRAINE TYPE: ICD-10-CM

## 2021-04-29 DIAGNOSIS — Z23 NEED FOR VACCINATION: ICD-10-CM

## 2021-04-29 PROCEDURE — 90471 IMMUNIZATION ADMIN: CPT | Mod: S$GLB,,, | Performed by: INTERNAL MEDICINE

## 2021-04-29 PROCEDURE — 90715 TDAP VACCINE GREATER THAN OR EQUAL TO 7YO IM: ICD-10-PCS | Mod: S$GLB,,, | Performed by: INTERNAL MEDICINE

## 2021-04-29 PROCEDURE — 99214 PR OFFICE/OUTPT VISIT, EST, LEVL IV, 30-39 MIN: ICD-10-PCS | Mod: 25,S$GLB,, | Performed by: INTERNAL MEDICINE

## 2021-04-29 PROCEDURE — 90471 TDAP VACCINE GREATER THAN OR EQUAL TO 7YO IM: ICD-10-PCS | Mod: S$GLB,,, | Performed by: INTERNAL MEDICINE

## 2021-04-29 PROCEDURE — 90715 TDAP VACCINE 7 YRS/> IM: CPT | Mod: S$GLB,,, | Performed by: INTERNAL MEDICINE

## 2021-04-29 PROCEDURE — 99999 PR PBB SHADOW E&M-EST. PATIENT-LVL IV: ICD-10-PCS | Mod: PBBFAC,,, | Performed by: INTERNAL MEDICINE

## 2021-04-29 PROCEDURE — 99214 OFFICE O/P EST MOD 30 MIN: CPT | Mod: 25,S$GLB,, | Performed by: INTERNAL MEDICINE

## 2021-04-29 PROCEDURE — 99999 PR PBB SHADOW E&M-EST. PATIENT-LVL IV: CPT | Mod: PBBFAC,,, | Performed by: INTERNAL MEDICINE

## 2021-04-29 RX ORDER — TRAZODONE HYDROCHLORIDE 50 MG/1
50 TABLET ORAL NIGHTLY
Qty: 30 TABLET | Refills: 3 | Status: SHIPPED | OUTPATIENT
Start: 2021-04-29 | End: 2022-05-26

## 2021-06-13 ENCOUNTER — PATIENT MESSAGE (OUTPATIENT)
Dept: FAMILY MEDICINE | Facility: CLINIC | Age: 56
End: 2021-06-13

## 2021-06-14 RX ORDER — CITALOPRAM 20 MG/1
20 TABLET, FILM COATED ORAL DAILY
Qty: 90 TABLET | Refills: 3 | Status: SHIPPED | OUTPATIENT
Start: 2021-06-14 | End: 2023-02-14 | Stop reason: SDUPTHER

## 2021-07-07 DIAGNOSIS — Z12.31 OTHER SCREENING MAMMOGRAM: ICD-10-CM

## 2021-08-17 ENCOUNTER — PATIENT MESSAGE (OUTPATIENT)
Dept: FAMILY MEDICINE | Facility: CLINIC | Age: 56
End: 2021-08-17

## 2021-10-05 ENCOUNTER — PATIENT MESSAGE (OUTPATIENT)
Dept: ADMINISTRATIVE | Facility: HOSPITAL | Age: 56
End: 2021-10-05

## 2021-10-15 NOTE — TELEPHONE ENCOUNTER
Spoke to pt. She does planning on continuing care with dr henderson. Her last appt was 05/2019. She states she comes in once a year. Please approve pended med if ok   No

## 2021-10-19 ENCOUNTER — PATIENT MESSAGE (OUTPATIENT)
Dept: FAMILY MEDICINE | Facility: CLINIC | Age: 56
End: 2021-10-19

## 2021-10-19 DIAGNOSIS — E78.2 HYPERLIPIDEMIA, MIXED: Primary | ICD-10-CM

## 2021-10-19 RX ORDER — PRAVASTATIN SODIUM 20 MG/1
20 TABLET ORAL DAILY
Qty: 90 TABLET | Refills: 1 | Status: SHIPPED | OUTPATIENT
Start: 2021-10-19 | End: 2022-09-25

## 2021-10-22 LAB
ALBUMIN: 5 GRAM/DL (ref 3.5–5)
ALP SERPL-CCNC: 113 UNIT/L (ref 38–126)
ALT SERPL W P-5'-P-CCNC: 12 UNIT/L (ref 7–56)
ANION GAP SERPL CALC-SCNC: 17 MEQ/L (ref 9–18)
AST SERPL-CCNC: 16 UNIT/L (ref 7–40)
BILIRUB SERPL-MCNC: 1.2 MG/DL (ref 0–1.2)
BUN BLD-MCNC: 17 MG/DL (ref 7–21)
BUN/CREAT SERPL: 17 RATIO (ref 6–22)
CALC OSMOLALITY: 289 MOSM/KG (ref 275–295)
CALCIUM SERPL-MCNC: 9.9 MG/DL (ref 8.5–10.4)
CHLORIDE SERPL-SCNC: 104 MEQ/L (ref 98–107)
CHOL/HDLC RATIO: 3
CHOLEST SERPL-MSCNC: 207 MG/DL (ref 100–200)
CO2 SERPL-SCNC: 28 MEQ/L (ref 21–31)
CREAT SERPL-MCNC: 1 MG/DL (ref 0.5–1)
GFR: 60.6 ML/MIN/1.73M2
GLUCOSE SERPL-MCNC: 103 MG/DL (ref 70–100)
HDLC SERPL-MCNC: 62 MG/DL (ref 40–75)
LDLC SERPL CALC-MCNC: 121 MG/DL (ref 0–125)
NONHDLC SERPL-MCNC: 145 MG/DL (ref 60–125)
POTASSIUM SERPL-SCNC: 4.9 MEQ/L (ref 3.5–5)
SODIUM BLD-SCNC: 144 MEQ/L (ref 135–145)
TOTAL PROTEIN: 7.4 GRAM/DL (ref 6.3–8.2)
TRIGL SERPL-MCNC: 153 MG/DL (ref 30–150)

## 2021-10-28 ENCOUNTER — OFFICE VISIT (OUTPATIENT)
Dept: FAMILY MEDICINE | Facility: CLINIC | Age: 56
End: 2021-10-28
Payer: COMMERCIAL

## 2021-10-28 VITALS
DIASTOLIC BLOOD PRESSURE: 76 MMHG | BODY MASS INDEX: 24.57 KG/M2 | WEIGHT: 152.88 LBS | OXYGEN SATURATION: 98 % | HEART RATE: 69 BPM | HEIGHT: 66 IN | SYSTOLIC BLOOD PRESSURE: 128 MMHG | TEMPERATURE: 98 F

## 2021-10-28 DIAGNOSIS — F51.01 PRIMARY INSOMNIA: ICD-10-CM

## 2021-10-28 DIAGNOSIS — E78.2 HYPERLIPIDEMIA, MIXED: ICD-10-CM

## 2021-10-28 DIAGNOSIS — I10 ESSENTIAL HYPERTENSION: ICD-10-CM

## 2021-10-28 DIAGNOSIS — R06.09 DYSPNEA ON EXERTION: Primary | ICD-10-CM

## 2021-10-28 DIAGNOSIS — M85.80 OSTEOPENIA, UNSPECIFIED LOCATION: ICD-10-CM

## 2021-10-28 DIAGNOSIS — M17.0 PRIMARY OSTEOARTHRITIS OF BOTH KNEES: ICD-10-CM

## 2021-10-28 DIAGNOSIS — I20.89 ANGINAL EQUIVALENT: ICD-10-CM

## 2021-10-28 DIAGNOSIS — F41.9 ANXIETY: ICD-10-CM

## 2021-10-28 DIAGNOSIS — Z23 NEED FOR VACCINATION: ICD-10-CM

## 2021-10-28 DIAGNOSIS — G43.909 MIGRAINE WITHOUT STATUS MIGRAINOSUS, NOT INTRACTABLE, UNSPECIFIED MIGRAINE TYPE: ICD-10-CM

## 2021-10-28 PROCEDURE — 99214 OFFICE O/P EST MOD 30 MIN: CPT | Mod: 25,S$GLB,, | Performed by: INTERNAL MEDICINE

## 2021-10-28 PROCEDURE — 93010 ELECTROCARDIOGRAM REPORT: CPT | Mod: S$GLB,,, | Performed by: INTERNAL MEDICINE

## 2021-10-28 PROCEDURE — 90471 IMMUNIZATION ADMIN: CPT | Mod: S$GLB,,, | Performed by: INTERNAL MEDICINE

## 2021-10-28 PROCEDURE — 93005 EKG 12-LEAD: ICD-10-PCS | Mod: S$GLB,,, | Performed by: INTERNAL MEDICINE

## 2021-10-28 PROCEDURE — 99999 PR PBB SHADOW E&M-EST. PATIENT-LVL IV: ICD-10-PCS | Mod: PBBFAC,,, | Performed by: INTERNAL MEDICINE

## 2021-10-28 PROCEDURE — 99214 PR OFFICE/OUTPT VISIT, EST, LEVL IV, 30-39 MIN: ICD-10-PCS | Mod: 25,S$GLB,, | Performed by: INTERNAL MEDICINE

## 2021-10-28 PROCEDURE — 93005 ELECTROCARDIOGRAM TRACING: CPT | Mod: S$GLB,,, | Performed by: INTERNAL MEDICINE

## 2021-10-28 PROCEDURE — 99999 PR PBB SHADOW E&M-EST. PATIENT-LVL IV: CPT | Mod: PBBFAC,,, | Performed by: INTERNAL MEDICINE

## 2021-10-28 PROCEDURE — 93010 EKG 12-LEAD: ICD-10-PCS | Mod: S$GLB,,, | Performed by: INTERNAL MEDICINE

## 2021-10-28 PROCEDURE — 90471 FLU VACCINE (QUAD) GREATER THAN OR EQUAL TO 3YO PRESERVATIVE FREE IM: ICD-10-PCS | Mod: S$GLB,,, | Performed by: INTERNAL MEDICINE

## 2021-10-28 PROCEDURE — 90686 IIV4 VACC NO PRSV 0.5 ML IM: CPT | Mod: S$GLB,,, | Performed by: INTERNAL MEDICINE

## 2021-10-28 PROCEDURE — 90686 FLU VACCINE (QUAD) GREATER THAN OR EQUAL TO 3YO PRESERVATIVE FREE IM: ICD-10-PCS | Mod: S$GLB,,, | Performed by: INTERNAL MEDICINE

## 2021-10-28 RX ORDER — LINACLOTIDE 145 UG/1
145 CAPSULE, GELATIN COATED ORAL DAILY
COMMUNITY
Start: 2021-10-11 | End: 2024-01-12

## 2021-10-29 ENCOUNTER — TELEPHONE (OUTPATIENT)
Dept: ADMINISTRATIVE | Facility: HOSPITAL | Age: 56
End: 2021-10-29
Payer: COMMERCIAL

## 2021-11-03 ENCOUNTER — HOSPITAL ENCOUNTER (OUTPATIENT)
Dept: CARDIOLOGY | Facility: HOSPITAL | Age: 56
Discharge: HOME | End: 2021-11-03
Attending: INTERNAL MEDICINE
Payer: COMMERCIAL

## 2021-11-03 ENCOUNTER — PATIENT MESSAGE (OUTPATIENT)
Dept: FAMILY MEDICINE | Facility: CLINIC | Age: 56
End: 2021-11-03
Payer: COMMERCIAL

## 2021-11-03 VITALS — BODY MASS INDEX: 24.43 KG/M2 | WEIGHT: 152 LBS | HEIGHT: 66 IN

## 2021-11-03 DIAGNOSIS — R06.09 DYSPNEA ON EXERTION: ICD-10-CM

## 2021-11-03 DIAGNOSIS — I20.89 ANGINAL EQUIVALENT: ICD-10-CM

## 2021-11-03 LAB
CV STRESS BASE HR: 61 BPM
DIASTOLIC BLOOD PRESSURE: 74 MMHG
OHS CV CPX 1 MINUTE RECOVERY HEART RATE: 112 BPM
OHS CV CPX 85 PERCENT MAX PREDICTED HEART RATE MALE: 135
OHS CV CPX ESTIMATED METS: 11
OHS CV CPX MAX PREDICTED HEART RATE: 158
OHS CV CPX PATIENT IS FEMALE: 1
OHS CV CPX PATIENT IS MALE: 0
OHS CV CPX PEAK DIASTOLIC BLOOD PRESSURE: 68 MMHG
OHS CV CPX PEAK HEAR RATE: 144 BPM
OHS CV CPX PEAK RATE PRESSURE PRODUCT: NORMAL
OHS CV CPX PEAK SYSTOLIC BLOOD PRESSURE: 144 MMHG
OHS CV CPX PERCENT MAX PREDICTED HEART RATE ACHIEVED: 91
OHS CV CPX RATE PRESSURE PRODUCT PRESENTING: 7137
STRESS ECHO POST EXERCISE DUR MIN: 9 MINUTES
STRESS ECHO POST EXERCISE DUR SEC: 20 SECONDS
SYSTOLIC BLOOD PRESSURE: 117 MMHG

## 2021-11-03 PROCEDURE — 93016 CV STRESS TEST SUPVJ ONLY: CPT | Mod: ,,, | Performed by: INTERNAL MEDICINE

## 2021-11-03 PROCEDURE — 93016 EXERCISE STRESS - EKG (CUPID ONLY): ICD-10-PCS | Mod: ,,, | Performed by: INTERNAL MEDICINE

## 2021-11-03 PROCEDURE — 93017 CV STRESS TEST TRACING ONLY: CPT

## 2021-11-03 PROCEDURE — 93018 EXERCISE STRESS - EKG (CUPID ONLY): ICD-10-PCS | Mod: ,,, | Performed by: INTERNAL MEDICINE

## 2021-11-03 PROCEDURE — 93018 CV STRESS TEST I&R ONLY: CPT | Mod: ,,, | Performed by: INTERNAL MEDICINE

## 2021-11-20 DIAGNOSIS — I10 ESSENTIAL HYPERTENSION: ICD-10-CM

## 2021-11-22 RX ORDER — AMLODIPINE BESYLATE 5 MG/1
TABLET ORAL
Qty: 90 TABLET | Refills: 3 | Status: SHIPPED | OUTPATIENT
Start: 2021-11-22 | End: 2022-12-19 | Stop reason: SDUPTHER

## 2021-12-29 ENCOUNTER — PATIENT MESSAGE (OUTPATIENT)
Dept: FAMILY MEDICINE | Facility: CLINIC | Age: 56
End: 2021-12-29
Payer: COMMERCIAL

## 2021-12-29 DIAGNOSIS — R50.9 FEVER, UNSPECIFIED FEVER CAUSE: Primary | ICD-10-CM

## 2021-12-30 ENCOUNTER — PATIENT MESSAGE (OUTPATIENT)
Dept: FAMILY MEDICINE | Facility: CLINIC | Age: 56
End: 2021-12-30
Payer: COMMERCIAL

## 2022-01-27 RX ORDER — MELOXICAM 15 MG/1
TABLET ORAL
Qty: 30 TABLET | Refills: 1 | Status: SHIPPED | OUTPATIENT
Start: 2022-01-27 | End: 2023-07-10

## 2022-05-26 DIAGNOSIS — F51.01 PRIMARY INSOMNIA: ICD-10-CM

## 2022-05-26 RX ORDER — TRAZODONE HYDROCHLORIDE 50 MG/1
50 TABLET ORAL NIGHTLY
Qty: 90 TABLET | Refills: 1 | Status: SHIPPED | OUTPATIENT
Start: 2022-05-26 | End: 2023-07-03

## 2022-05-26 NOTE — TELEPHONE ENCOUNTER
No new care gaps identified.  [facility] Embedded Care Gaps. Reference number: [id number]. 5/26/2022   12:27:22 AM ANNE MARIET

## 2022-05-26 NOTE — TELEPHONE ENCOUNTER
Refill Routing Note   Medication(s) are not appropriate for processing by [facility] for the following reason(s):      - Indication is outside of scope for [facility]    [facility] action(s):  Route          Medication reconciliation completed: No     Appointments  past 12m or future 3m with PCP    Date Provider   Last Visit   10/28/2021 Clarke Almendarez MD   Next Visit   6/29/2022 Clarke Almendarez MD   ED visits in past 90 days: 0        Note composed:9:30 AM 05/26/2022

## 2022-06-24 ENCOUNTER — PATIENT MESSAGE (OUTPATIENT)
Dept: INTERNAL MEDICINE | Facility: CLINIC | Age: 57
End: 2022-06-24
Payer: COMMERCIAL

## 2022-06-24 DIAGNOSIS — I10 ESSENTIAL HYPERTENSION: ICD-10-CM

## 2022-06-24 DIAGNOSIS — Z86.2 HISTORY OF ANEMIA: ICD-10-CM

## 2022-06-24 DIAGNOSIS — Z00.00 ANNUAL PHYSICAL EXAM: ICD-10-CM

## 2022-06-24 DIAGNOSIS — E78.2 HYPERLIPIDEMIA, MIXED: Primary | ICD-10-CM

## 2022-06-26 LAB
ALBUMIN SERPL-MCNC: 4.7 G/DL (ref 3.6–5.1)
ALBUMIN/GLOB SERPL: 1.8 (CALC) (ref 1–2.5)
ALP SERPL-CCNC: 78 U/L (ref 37–153)
ALT SERPL-CCNC: 12 U/L (ref 6–29)
AST SERPL-CCNC: 16 U/L (ref 10–35)
BASOPHILS # BLD AUTO: 66 CELLS/UL (ref 0–200)
BASOPHILS NFR BLD AUTO: 0.8 %
BILIRUB SERPL-MCNC: 1.7 MG/DL (ref 0.2–1.2)
BUN SERPL-MCNC: 15 MG/DL (ref 7–25)
BUN/CREAT SERPL: ABNORMAL (CALC) (ref 6–22)
CALCIUM SERPL-MCNC: 9.8 MG/DL (ref 8.6–10.4)
CHLORIDE SERPL-SCNC: 103 MMOL/L (ref 98–110)
CHOLEST SERPL-MCNC: 195 MG/DL
CHOLEST/HDLC SERPL: 2.8 (CALC)
CO2 SERPL-SCNC: 31 MMOL/L (ref 20–32)
CREAT SERPL-MCNC: 0.9 MG/DL (ref 0.5–1.05)
EOSINOPHIL # BLD AUTO: 141 CELLS/UL (ref 15–500)
EOSINOPHIL NFR BLD AUTO: 1.7 %
ERYTHROCYTE [DISTWIDTH] IN BLOOD BY AUTOMATED COUNT: 13.6 % (ref 11–15)
GLOBULIN SER CALC-MCNC: 2.6 G/DL (CALC) (ref 1.9–3.7)
GLUCOSE SERPL-MCNC: 93 MG/DL (ref 65–99)
HCT VFR BLD AUTO: 42.4 % (ref 35–45)
HDLC SERPL-MCNC: 69 MG/DL
HGB BLD-MCNC: 13.9 G/DL (ref 11.7–15.5)
LDLC SERPL CALC-MCNC: 105 MG/DL (CALC)
LYMPHOCYTES # BLD AUTO: 2191 CELLS/UL (ref 850–3900)
LYMPHOCYTES NFR BLD AUTO: 26.4 %
MCH RBC QN AUTO: 28.7 PG (ref 27–33)
MCHC RBC AUTO-ENTMCNC: 32.8 G/DL (ref 32–36)
MCV RBC AUTO: 87.6 FL (ref 80–100)
MONOCYTES # BLD AUTO: 930 CELLS/UL (ref 200–950)
MONOCYTES NFR BLD AUTO: 11.2 %
NEUTROPHILS # BLD AUTO: 4972 CELLS/UL (ref 1500–7800)
NEUTROPHILS NFR BLD AUTO: 59.9 %
NONHDLC SERPL-MCNC: 126 MG/DL (CALC)
PLATELET # BLD AUTO: 325 THOUSAND/UL (ref 140–400)
PMV BLD REES-ECKER: 9.9 FL (ref 7.5–12.5)
POTASSIUM SERPL-SCNC: 4.5 MMOL/L (ref 3.5–5.3)
PROT SERPL-MCNC: 7.3 G/DL (ref 6.1–8.1)
RBC # BLD AUTO: 4.84 MILLION/UL (ref 3.8–5.1)
SODIUM SERPL-SCNC: 141 MMOL/L (ref 135–146)
TRIGL SERPL-MCNC: 118 MG/DL
TSH SERPL-ACNC: 1.46 MIU/L
WBC # BLD AUTO: 8.3 THOUSAND/UL (ref 3.8–10.8)

## 2022-06-29 ENCOUNTER — OFFICE VISIT (OUTPATIENT)
Dept: INTERNAL MEDICINE | Facility: CLINIC | Age: 57
End: 2022-06-29
Payer: COMMERCIAL

## 2022-06-29 VITALS
BODY MASS INDEX: 24.03 KG/M2 | WEIGHT: 149.5 LBS | DIASTOLIC BLOOD PRESSURE: 78 MMHG | SYSTOLIC BLOOD PRESSURE: 110 MMHG | OXYGEN SATURATION: 98 % | HEART RATE: 89 BPM | HEIGHT: 66 IN

## 2022-06-29 DIAGNOSIS — F41.9 ANXIETY: ICD-10-CM

## 2022-06-29 DIAGNOSIS — M17.0 PRIMARY OSTEOARTHRITIS OF BOTH KNEES: ICD-10-CM

## 2022-06-29 DIAGNOSIS — G43.909 MIGRAINE WITHOUT STATUS MIGRAINOSUS, NOT INTRACTABLE, UNSPECIFIED MIGRAINE TYPE: ICD-10-CM

## 2022-06-29 DIAGNOSIS — I10 ESSENTIAL HYPERTENSION: Primary | ICD-10-CM

## 2022-06-29 DIAGNOSIS — E78.2 HYPERLIPIDEMIA, MIXED: ICD-10-CM

## 2022-06-29 PROCEDURE — 99999 PR PBB SHADOW E&M-EST. PATIENT-LVL IV: CPT | Mod: PBBFAC,,, | Performed by: INTERNAL MEDICINE

## 2022-06-29 PROCEDURE — 1159F PR MEDICATION LIST DOCUMENTED IN MEDICAL RECORD: ICD-10-PCS | Mod: CPTII,S$GLB,, | Performed by: INTERNAL MEDICINE

## 2022-06-29 PROCEDURE — 3008F PR BODY MASS INDEX (BMI) DOCUMENTED: ICD-10-PCS | Mod: CPTII,S$GLB,, | Performed by: INTERNAL MEDICINE

## 2022-06-29 PROCEDURE — 99214 PR OFFICE/OUTPT VISIT, EST, LEVL IV, 30-39 MIN: ICD-10-PCS | Mod: S$GLB,,, | Performed by: INTERNAL MEDICINE

## 2022-06-29 PROCEDURE — 3078F PR MOST RECENT DIASTOLIC BLOOD PRESSURE < 80 MM HG: ICD-10-PCS | Mod: CPTII,S$GLB,, | Performed by: INTERNAL MEDICINE

## 2022-06-29 PROCEDURE — 99999 PR PBB SHADOW E&M-EST. PATIENT-LVL IV: ICD-10-PCS | Mod: PBBFAC,,, | Performed by: INTERNAL MEDICINE

## 2022-06-29 PROCEDURE — 1160F PR REVIEW ALL MEDS BY PRESCRIBER/CLIN PHARMACIST DOCUMENTED: ICD-10-PCS | Mod: CPTII,S$GLB,, | Performed by: INTERNAL MEDICINE

## 2022-06-29 PROCEDURE — 3074F PR MOST RECENT SYSTOLIC BLOOD PRESSURE < 130 MM HG: ICD-10-PCS | Mod: CPTII,S$GLB,, | Performed by: INTERNAL MEDICINE

## 2022-06-29 PROCEDURE — 99214 OFFICE O/P EST MOD 30 MIN: CPT | Mod: S$GLB,,, | Performed by: INTERNAL MEDICINE

## 2022-06-29 PROCEDURE — 3078F DIAST BP <80 MM HG: CPT | Mod: CPTII,S$GLB,, | Performed by: INTERNAL MEDICINE

## 2022-06-29 PROCEDURE — 1159F MED LIST DOCD IN RCRD: CPT | Mod: CPTII,S$GLB,, | Performed by: INTERNAL MEDICINE

## 2022-06-29 PROCEDURE — 3008F BODY MASS INDEX DOCD: CPT | Mod: CPTII,S$GLB,, | Performed by: INTERNAL MEDICINE

## 2022-06-29 PROCEDURE — 1160F RVW MEDS BY RX/DR IN RCRD: CPT | Mod: CPTII,S$GLB,, | Performed by: INTERNAL MEDICINE

## 2022-06-29 PROCEDURE — 3074F SYST BP LT 130 MM HG: CPT | Mod: CPTII,S$GLB,, | Performed by: INTERNAL MEDICINE

## 2022-06-29 NOTE — PROGRESS NOTES
[facility] Note    Chief Complaint      Chief Complaint   Patient presents with    Hypertension     6 month f/u       History of Present Illness      April Myranda Moser is a 57 y.o. female with chronic conditions of HTN, HLD, anxiety, osteaorthritis, migraines who presents today for: follow up chronic conditions.  Fractured left wrist since last visit, did not require surgery, casted and has fully healed at this point.   HTN: BP at goal on amlodipine.   HLD: Controlled on pravastatin.  .  Anxiety: Doing well on celexa. No new or worsening symptoms.    Osteoarthritis, knees: Doing well on meloxicam as needed.  Does have some soreness with walking/hiking.  Had steroid injections with Dr. Heaton.  Migraines: Doing well.  No recent exacerbation     Flu shot UTD.  TdAP 2021.  Shingles vaccine discussed.  Pneumonia vaccine due age 67. COVID UTD.  Mammogram UTD 2022.  PAP smear UTD 2022, Dr. Agarwal.  DEXA 2020, osteopenia, sees Dr. Mondragon.  Cscope 2017 with Dr. Enriquez, no polyps, 10 yr interval.    Past Medical History:  History reviewed. No pertinent past medical history.    Past Surgical History:   has a past surgical history that includes colonosocpy (04/18/2017).    Family History:  family history includes Aneurysm in her father; No Known Problems in her brother, mother, and sister.     Social History:  Social History     Tobacco Use    Smoking status: Never Smoker    Smokeless tobacco: Never Used   Substance Use Topics    Alcohol use: Yes     Alcohol/week: 1.0 standard drink     Types: 1 Glasses of wine per week    Drug use: Never       I personally reviewed all past medical, surgical, social and family history.    Review of Systems   Constitutional: Negative for chills, fever and malaise/fatigue.   Respiratory: Negative for shortness of breath.    Cardiovascular: Negative for chest pain.   Gastrointestinal: Negative for constipation, diarrhea, nausea and vomiting.   Skin: Negative  for rash.   Neurological: Negative for weakness.   All other systems reviewed and are negative.       Medications:  Outpatient Encounter Medications as of 6/29/2022   Medication Sig Dispense Refill    amLODIPine (NORVASC) 5 MG tablet TAKE 1 TABLET BY MOUTH EVERY DAY 90 tablet 3    citalopram (CELEXA) 20 MG tablet Take 1 tablet (20 mg total) by mouth once daily. 90 tablet 3    doxycycline (VIBRAMYCIN) 100 MG Cap       ergocalciferol (ERGOCALCIFEROL) 50,000 unit Cap TAKE ONE PILL ONCE A MONTH      FLUCELVAX QUAD 8738-7531, PF, 60 mcg (15 mcg x 4)/0.5 mL Syrg TO BE ADMINISTERED BY PHARMACIST FOR IMMUNIZATION  0    LINZESS 145 mcg Cap capsule Take 145 mcg by mouth once daily.      meloxicam (MOBIC) 15 MG tablet TAKE 1 TABLET BY MOUTH EVERY DAY 30 tablet 1    pravastatin (PRAVACHOL) 20 MG tablet Take 1 tablet (20 mg total) by mouth once daily. 90 tablet 1    traZODone (DESYREL) 50 MG tablet TAKE 1 TABLET (50 MG TOTAL) BY MOUTH EVERY EVENING. 90 tablet 1     No facility-administered encounter medications on file as of 6/29/2022.       Allergies:  Review of patient's allergies indicates:   Allergen Reactions    Cefdinir Diarrhea and Nausea And Vomiting       Health Maintenance:  Immunization History   Administered Date(s) Administered    COVID-19, MRNA, LN-S, PF (Pfizer) (Purple Cap) 07/30/2021, 08/20/2021    Influenza 10/23/2019, 09/26/2020    Influenza - Quadrivalent - MDCK - PF 10/23/2019    Influenza - Quadrivalent - PF *Preferred* (6 months and older) 12/18/2014, 12/07/2015, 10/27/2016, 10/30/2017, 10/28/2018, 09/26/2020, 10/28/2021    Td (ADULT) 05/25/2018    Tdap 04/29/2021      Health Maintenance   Topic Date Due    Aspirin/Antiplatelet Therapy  Never done    Mammogram  06/25/2022    Lipid Panel  06/25/2027    TETANUS VACCINE  04/29/2031    Hepatitis C Screening  Completed        Physical Exam      Vital Signs  Pulse: 89  SpO2: 98 %  BP: 110/78  BP Location: Left arm  Patient Position:  "Sitting  Pain Score: 0-No pain  Height and Weight  Height: 5' 6" (167.6 cm)  Weight: 67.8 kg (149 lb 7.6 oz)  BSA (Calculated - sq m): 1.78 sq meters  BMI (Calculated): 24.1  Weight in (lb) to have BMI = 25: 154.6]    Physical Exam  Vitals reviewed.   Constitutional:       Appearance: She is well-developed.   HENT:      Head: Normocephalic and atraumatic.      Right Ear: External ear normal.      Left Ear: External ear normal.   Cardiovascular:      Rate and Rhythm: Normal rate and regular rhythm.      Heart sounds: Normal heart sounds. No murmur heard.  Pulmonary:      Effort: Pulmonary effort is normal.      Breath sounds: Normal breath sounds. No wheezing or rales.   Abdominal:      General: Bowel sounds are normal. There is no distension.      Palpations: Abdomen is soft.      Tenderness: There is no abdominal tenderness.          Laboratory:  CBC:  Recent Labs   Lab 10/15/20  0734 04/14/21  0636 06/25/22  0914   WBC 6.2 6.4 8.3   RBC 4.37 4.45 4.84   Hemoglobin 12.7 12.8 13.9   Hematocrit 38.8 39.8 42.4   Platelets 267 291 325   MCV 88.8 89.4 87.6   MCH 29.1 28.8 28.7   MCHC 32.7 32.2 32.8     CMP:  Recent Labs   Lab 04/14/21  0636 10/22/21  0738 06/25/22  0914   Glucose 96 103 H 93   Calcium 9.7 9.9 9.8   Albumin 4.4  --  4.7   ALBUMIN  --  5.0  --    Total Protein 7.2 7.4 7.3   Sodium 139 144 141   Potassium 4.2 4.9 4.5   CO2 27 28 31   Chloride 104 104 103   BUN 13 17 15   Alkaline Phosphatase  --  113  --    ALT 15 12 12   AST 17 16 16   Total Bilirubin 1.0 1.2 1.7 H     URINALYSIS:       LIPIDS:  Recent Labs   Lab 10/15/20  0734 04/14/21  0636 10/22/21  0738 06/25/22  0914   TSH 1.92 3.11  --  1.46   HDL 62 64 62 69   Cholesterol 189 218 H 207 H 195   Triglycerides 155 H 175 H 153 H 118   LDL Calculated  --   --  121  --    LDL Cholesterol 102 H 125 H  --  105 H   HDL/Cholesterol Ratio 3.0 3.4  --  2.8   Non-HDL Cholesterol  --   --  145 H  --    Non HDL Chol. (LDL+VLDL) 127 154 H  --  126     TSH:  Recent " Labs   Lab 10/15/20  0734 04/14/21  0636 06/25/22  0914   TSH 1.92 3.11 1.46     A1C:        Assessment/Plan     April Myranda Moser is a 57 y.o.female with:    1. Essential hypertension  Continue current meds.    2. Hyperlipidemia, mixed  Continue current meds.    3. Anxiety  Continue current meds.    4. Primary osteoarthritis of both knees  Continue current meds.    5. Migraine without status migrainosus, not intractable, unspecified migraine type  Continue current meds.      Chronic conditions status updated as per HPI.  Other than changes above, cont current medications and maintain follow up with specialists.  Follow up in about 6 months (around 12/29/2022) for Follow up visit.    No future appointments.    Clarke Almendarez MD  [facility]

## 2022-07-11 ENCOUNTER — PATIENT MESSAGE (OUTPATIENT)
Dept: ADMINISTRATIVE | Facility: HOSPITAL | Age: 57
End: 2022-07-11
Payer: COMMERCIAL

## 2022-07-14 ENCOUNTER — PATIENT OUTREACH (OUTPATIENT)
Dept: ADMINISTRATIVE | Facility: HOSPITAL | Age: 57
End: 2022-07-14
Payer: COMMERCIAL

## 2022-07-14 NOTE — PROGRESS NOTES
Health Maintenance Due   Topic Date Due    Aspirin/Antiplatelet Therapy  Never done    Shingles Vaccine (1 of 2) Never done    COVID-19 Vaccine (3 - Booster for Pfizer series) 01/20/2022    Mammogram  06/25/2022     Chart review done. HM updated. Immunizations reviewed & updated. Care Everywhere updated.  Patient stated in portal message that she would schedule Mammogram.

## 2022-07-19 LAB — BCS RECOMMENDATION EXT: NORMAL

## 2022-08-19 ENCOUNTER — PATIENT OUTREACH (OUTPATIENT)
Dept: ADMINISTRATIVE | Facility: HOSPITAL | Age: 57
End: 2022-08-19
Payer: COMMERCIAL

## 2022-08-19 NOTE — PROGRESS NOTES
Health Maintenance Due   Topic Date Due    Aspirin/Antiplatelet Therapy  Never done    Shingles Vaccine (1 of 2) Never done    COVID-19 Vaccine (3 - Booster for Pfizer series) 01/20/2022     Chart review done. HM updated. Immunizations reviewed & updated. Care Everywhere updated.  Mammogram from [facility] on 7/19/22 scanned into chart.

## 2022-12-05 ENCOUNTER — PATIENT MESSAGE (OUTPATIENT)
Dept: INTERNAL MEDICINE | Facility: CLINIC | Age: 57
End: 2022-12-05
Payer: COMMERCIAL

## 2023-02-13 ENCOUNTER — PATIENT MESSAGE (OUTPATIENT)
Dept: PRIMARY CARE CLINIC | Facility: CLINIC | Age: 58
End: 2023-02-13
Payer: COMMERCIAL

## 2023-02-13 DIAGNOSIS — M85.80 OSTEOPENIA, UNSPECIFIED LOCATION: Primary | ICD-10-CM

## 2023-02-13 RX ORDER — ERGOCALCIFEROL 1.25 MG/1
50000 CAPSULE ORAL
Qty: 12 CAPSULE | Refills: 0 | Status: SHIPPED | OUTPATIENT
Start: 2023-02-13 | End: 2023-11-20

## 2023-02-14 RX ORDER — CITALOPRAM 20 MG/1
20 TABLET, FILM COATED ORAL DAILY
Qty: 90 TABLET | Refills: 3 | Status: SHIPPED | OUTPATIENT
Start: 2023-02-14 | End: 2023-07-04 | Stop reason: SDUPTHER

## 2023-02-14 NOTE — TELEPHONE ENCOUNTER
----- Message from Adina Burroughs sent at 2/14/2023  1:27 PM CST -----  Contact: Pharmacy @ [phone]  Requesting an RX refill or new RX.  Is this a refill or new RX:   RX name and strength citalopram (CELEXA) 20 MG tablet  Is this a 30 day or 90 day RX:   Pharmacy name and phone # [facility] - [place], [place] - [address]  The doctors have asked that we provide their patients with the following 2 reminders -- prescription refills can take up to 72 hours, and a friendly reminder that in the future you can use your [facility] account to request refills:

## 2023-04-10 ENCOUNTER — PATIENT MESSAGE (OUTPATIENT)
Dept: ADMINISTRATIVE | Facility: OTHER | Age: 58
End: 2023-04-10
Payer: COMMERCIAL

## 2023-05-02 ENCOUNTER — PATIENT MESSAGE (OUTPATIENT)
Dept: PRIMARY CARE CLINIC | Facility: CLINIC | Age: 58
End: 2023-05-02
Payer: COMMERCIAL

## 2023-05-14 ENCOUNTER — PATIENT MESSAGE (OUTPATIENT)
Dept: PRIMARY CARE CLINIC | Facility: CLINIC | Age: 58
End: 2023-05-14
Payer: COMMERCIAL

## 2023-06-19 ENCOUNTER — PATIENT MESSAGE (OUTPATIENT)
Dept: PRIMARY CARE CLINIC | Facility: CLINIC | Age: 58
End: 2023-06-19
Payer: COMMERCIAL

## 2023-06-19 DIAGNOSIS — Z00.00 ANNUAL PHYSICAL EXAM: Primary | ICD-10-CM

## 2023-06-19 NOTE — TELEPHONE ENCOUNTER
Pt requesting labs prior to 7/10 annual, asking for labs to evaluate fatigue. Pended orders if okay, please add/remove

## 2023-07-03 ENCOUNTER — OFFICE VISIT (OUTPATIENT)
Dept: URGENT CARE | Facility: CLINIC | Age: 58
End: 2023-07-03
Payer: COMMERCIAL

## 2023-07-03 VITALS
BODY MASS INDEX: 22.27 KG/M2 | SYSTOLIC BLOOD PRESSURE: 136 MMHG | OXYGEN SATURATION: 97 % | DIASTOLIC BLOOD PRESSURE: 80 MMHG | HEART RATE: 73 BPM | TEMPERATURE: 98 F | RESPIRATION RATE: 16 BRPM | WEIGHT: 138 LBS

## 2023-07-03 DIAGNOSIS — J01.00 ACUTE MAXILLARY SINUSITIS, RECURRENCE NOT SPECIFIED: Primary | ICD-10-CM

## 2023-07-03 LAB
CTP QC/QA: YES
SARS-COV-2 AG RESP QL IA.RAPID: NEGATIVE

## 2023-07-03 PROCEDURE — 99213 OFFICE O/P EST LOW 20 MIN: CPT | Mod: 25,S$GLB,, | Performed by: FAMILY MEDICINE

## 2023-07-03 PROCEDURE — 96372 PR INJECTION,THERAP/PROPH/DIAG2ST, IM OR SUBCUT: ICD-10-PCS | Mod: S$GLB,,, | Performed by: FAMILY MEDICINE

## 2023-07-03 PROCEDURE — 87811 SARS CORONAVIRUS 2 ANTIGEN POCT, MANUAL READ: ICD-10-PCS | Mod: QW,S$GLB,, | Performed by: FAMILY MEDICINE

## 2023-07-03 PROCEDURE — 87811 SARS-COV-2 COVID19 W/OPTIC: CPT | Mod: QW,S$GLB,, | Performed by: FAMILY MEDICINE

## 2023-07-03 PROCEDURE — 99213 PR OFFICE/OUTPT VISIT, EST, LEVL III, 20-29 MIN: ICD-10-PCS | Mod: 25,S$GLB,, | Performed by: FAMILY MEDICINE

## 2023-07-03 PROCEDURE — 96372 THER/PROPH/DIAG INJ SC/IM: CPT | Mod: S$GLB,,, | Performed by: FAMILY MEDICINE

## 2023-07-03 RX ORDER — DEXAMETHASONE SODIUM PHOSPHATE 100 MG/10ML
10 INJECTION INTRAMUSCULAR; INTRAVENOUS
Status: COMPLETED | OUTPATIENT
Start: 2023-07-03 | End: 2023-07-03

## 2023-07-03 RX ORDER — DOXYCYCLINE 100 MG/1
100 CAPSULE ORAL 2 TIMES DAILY
Qty: 14 CAPSULE | Refills: 0 | Status: SHIPPED | OUTPATIENT
Start: 2023-07-03 | End: 2023-07-10

## 2023-07-03 RX ORDER — FLUTICASONE PROPIONATE 50 MCG
1 SPRAY, SUSPENSION (ML) NASAL DAILY
Qty: 9.9 ML | Refills: 0 | Status: SHIPPED | OUTPATIENT
Start: 2023-07-03

## 2023-07-03 RX ADMIN — DEXAMETHASONE SODIUM PHOSPHATE 10 MG: 100 INJECTION INTRAMUSCULAR; INTRAVENOUS at 05:07

## 2023-07-03 NOTE — PROGRESS NOTES
Subjective:      Patient ID: April Myranda Moser is a 58 y.o. female.    Vitals:  weight is 62.6 kg (138 lb). Her oral temperature is 98.3 °F (36.8 °C). Her blood pressure is 136/80 and her pulse is 73. Her respiration is 16 and oxygen saturation is 97%.     Chief Complaint: Sinus Problem    This is a 58 y.o. female who presents today with a chief complaint of non-productive cough, nasal congestion, sore throat (pt says she feels as though she cannot swallow), sinus pressure and headache x 3 weeks. Pt also presents with ringing in the ears and fatigue.  No fever, no runny nose, ear px, ear congestion, nausea, vomiting, diarrhea, or abd px.     Home tx: sudafed, flonase, dayquil, claratin-D, sinus rinse, nyquil, advil migraine, thera flu    PMH: prone to sinus infections    Sinus Problem  The current episode started 1 to 4 weeks ago. The problem has been gradually worsening since onset. There has been no fever. Her pain is at a severity of 3/10 (head). The pain is mild. Associated symptoms include congestion, coughing, headaches, sinus pressure, sneezing and a sore throat. Pertinent negatives include no ear pain or swollen glands.     HENT:  Positive for congestion, sinus pressure and sore throat. Negative for ear pain.    Respiratory:  Positive for cough.    Allergic/Immunologic: Positive for sneezing.   Neurological:  Positive for headaches.    Objective:     Physical Exam   Constitutional: She does not appear ill. No distress. normal  HENT:   Head: Normocephalic and atraumatic.   Nose: Rhinorrhea and congestion present. Right sinus exhibits maxillary sinus tenderness and frontal sinus tenderness. Left sinus exhibits maxillary sinus tenderness and frontal sinus tenderness.   Mouth/Throat: Mucous membranes are moist. Posterior oropharyngeal erythema present.   Eyes: Pupils are equal, round, and reactive to light. Extraocular movement intact   Neck: Neck supple.   Cardiovascular: Normal rate, regular rhythm, normal  heart sounds and normal pulses.   Pulmonary/Chest: Effort normal and breath sounds normal.   Abdominal: Normal appearance. Soft.   Neurological: She is alert.   Nursing note and vitals reviewed.  Results for orders placed or performed in visit on 07/03/23   SARS Coronavirus 2 Antigen, POCT Manual Read   Result Value Ref Range    SARS Coronavirus 2 Antigen Negative Negative     Acceptable Yes       Assessment:     1. Acute maxillary sinusitis, recurrence not specified        Plan:       Acute maxillary sinusitis, recurrence not specified  -     SARS Coronavirus 2 Antigen, POCT Manual Read  -     doxycycline (VIBRAMYCIN) 100 MG Cap; Take 1 capsule (100 mg total) by mouth 2 (two) times daily. for 7 days  Dispense: 14 capsule; Refill: 0  -     fluticasone propionate (FLONASE) 50 mcg/actuation nasal spray; 1 spray (50 mcg total) by Each Nostril route once daily.  Dispense: 9.9 mL; Refill: 0  -     dexAMETHasone injection 10 mg

## 2023-07-05 RX ORDER — CITALOPRAM 20 MG/1
20 TABLET, FILM COATED ORAL DAILY
Qty: 90 TABLET | Refills: 3 | Status: SHIPPED | OUTPATIENT
Start: 2023-07-05 | End: 2023-07-10

## 2023-07-05 RX ORDER — PRAVASTATIN SODIUM 20 MG/1
20 TABLET ORAL DAILY
Qty: 90 TABLET | Refills: 2 | Status: SHIPPED | OUTPATIENT
Start: 2023-07-05

## 2023-07-05 NOTE — TELEPHONE ENCOUNTER
Care Due:                  Date            Visit Type   Department     Provider  --------------------------------------------------------------------------------                                EP -                              PRIMARY      [facility] PRIMARY  Last Visit: 06-      CARE ([facility])   CARE           Clarke Dooley                               -                              PRIMARY  Next Visit: 07-      CARE ([facility])   None Found     Clarke Dooley                                                            Last  Test          Frequency    Reason                     Performed    Due Date  --------------------------------------------------------------------------------    CMP.........  12 months..  pravastatin..............  06- 06-    Lipid Panel.  12 months..  pravastatin..............  06- 06-    [employer] Embedded Care Due Messages. Reference number: [id number].   7/04/2023 8:38:19 PM CDT

## 2023-07-09 LAB
ALBUMIN SERPL-MCNC: 4.5 G/DL (ref 3.6–5.1)
ALBUMIN/GLOB SERPL: 1.9 (CALC) (ref 1–2.5)
ALP SERPL-CCNC: 77 U/L (ref 37–153)
ALT SERPL-CCNC: 14 U/L (ref 6–29)
AST SERPL-CCNC: 15 U/L (ref 10–35)
BASOPHILS # BLD AUTO: 67 CELLS/UL (ref 0–200)
BASOPHILS NFR BLD AUTO: 0.7 %
BILIRUB SERPL-MCNC: 1.5 MG/DL (ref 0.2–1.2)
BUN SERPL-MCNC: 16 MG/DL (ref 7–25)
BUN/CREAT SERPL: ABNORMAL (CALC) (ref 6–22)
CALCIUM SERPL-MCNC: 9.5 MG/DL (ref 8.6–10.4)
CHLORIDE SERPL-SCNC: 102 MMOL/L (ref 98–110)
CHOLEST SERPL-MCNC: 199 MG/DL
CHOLEST/HDLC SERPL: 2.6 (CALC)
CO2 SERPL-SCNC: 30 MMOL/L (ref 20–32)
CREAT SERPL-MCNC: 0.88 MG/DL (ref 0.5–1.03)
EGFR: 77 ML/MIN/1.73M2
EOSINOPHIL # BLD AUTO: 67 CELLS/UL (ref 15–500)
EOSINOPHIL NFR BLD AUTO: 0.7 %
ERYTHROCYTE [DISTWIDTH] IN BLOOD BY AUTOMATED COUNT: 13.8 % (ref 11–15)
GLOBULIN SER CALC-MCNC: 2.4 G/DL (CALC) (ref 1.9–3.7)
GLUCOSE SERPL-MCNC: 93 MG/DL (ref 65–99)
HCT VFR BLD AUTO: 43 % (ref 35–45)
HDLC SERPL-MCNC: 76 MG/DL
HGB BLD-MCNC: 14.3 G/DL (ref 11.7–15.5)
LDLC SERPL CALC-MCNC: 99 MG/DL (CALC)
LYMPHOCYTES # BLD AUTO: 2613 CELLS/UL (ref 850–3900)
LYMPHOCYTES NFR BLD AUTO: 27.5 %
MCH RBC QN AUTO: 29.3 PG (ref 27–33)
MCHC RBC AUTO-ENTMCNC: 33.3 G/DL (ref 32–36)
MCV RBC AUTO: 88.1 FL (ref 80–100)
MONOCYTES # BLD AUTO: 998 CELLS/UL (ref 200–950)
MONOCYTES NFR BLD AUTO: 10.5 %
NEUTROPHILS # BLD AUTO: 5757 CELLS/UL (ref 1500–7800)
NEUTROPHILS NFR BLD AUTO: 60.6 %
NONHDLC SERPL-MCNC: 123 MG/DL (CALC)
PLATELET # BLD AUTO: 356 THOUSAND/UL (ref 140–400)
PMV BLD REES-ECKER: 9.8 FL (ref 7.5–12.5)
POTASSIUM SERPL-SCNC: 4 MMOL/L (ref 3.5–5.3)
PROT SERPL-MCNC: 6.9 G/DL (ref 6.1–8.1)
RBC # BLD AUTO: 4.88 MILLION/UL (ref 3.8–5.1)
SODIUM SERPL-SCNC: 139 MMOL/L (ref 135–146)
TRIGL SERPL-MCNC: 142 MG/DL
TSH SERPL-ACNC: 1.56 MIU/L (ref 0.4–4.5)
WBC # BLD AUTO: 9.5 THOUSAND/UL (ref 3.8–10.8)

## 2023-07-10 ENCOUNTER — OFFICE VISIT (OUTPATIENT)
Dept: PRIMARY CARE CLINIC | Facility: CLINIC | Age: 58
End: 2023-07-10
Payer: COMMERCIAL

## 2023-07-10 VITALS
OXYGEN SATURATION: 98 % | HEART RATE: 79 BPM | DIASTOLIC BLOOD PRESSURE: 70 MMHG | WEIGHT: 136.25 LBS | BODY MASS INDEX: 21.9 KG/M2 | SYSTOLIC BLOOD PRESSURE: 108 MMHG | HEIGHT: 66 IN

## 2023-07-10 DIAGNOSIS — I10 ESSENTIAL HYPERTENSION: ICD-10-CM

## 2023-07-10 DIAGNOSIS — G43.909 MIGRAINE WITHOUT STATUS MIGRAINOSUS, NOT INTRACTABLE, UNSPECIFIED MIGRAINE TYPE: ICD-10-CM

## 2023-07-10 DIAGNOSIS — Z00.00 ANNUAL PHYSICAL EXAM: Primary | ICD-10-CM

## 2023-07-10 DIAGNOSIS — M85.80 OSTEOPENIA, UNSPECIFIED LOCATION: ICD-10-CM

## 2023-07-10 DIAGNOSIS — F51.01 PRIMARY INSOMNIA: ICD-10-CM

## 2023-07-10 DIAGNOSIS — F41.9 ANXIETY: ICD-10-CM

## 2023-07-10 DIAGNOSIS — M17.0 PRIMARY OSTEOARTHRITIS OF BOTH KNEES: ICD-10-CM

## 2023-07-10 DIAGNOSIS — E78.2 HYPERLIPIDEMIA, MIXED: ICD-10-CM

## 2023-07-10 PROCEDURE — 1159F PR MEDICATION LIST DOCUMENTED IN MEDICAL RECORD: ICD-10-PCS | Mod: CPTII,S$GLB,, | Performed by: INTERNAL MEDICINE

## 2023-07-10 PROCEDURE — 1159F MED LIST DOCD IN RCRD: CPT | Mod: CPTII,S$GLB,, | Performed by: INTERNAL MEDICINE

## 2023-07-10 PROCEDURE — 99396 PR PREVENTIVE VISIT,EST,40-64: ICD-10-PCS | Mod: S$GLB,,, | Performed by: INTERNAL MEDICINE

## 2023-07-10 PROCEDURE — 99999 PR PBB SHADOW E&M-EST. PATIENT-LVL IV: ICD-10-PCS | Mod: PBBFAC,,, | Performed by: INTERNAL MEDICINE

## 2023-07-10 PROCEDURE — 3074F PR MOST RECENT SYSTOLIC BLOOD PRESSURE < 130 MM HG: ICD-10-PCS | Mod: CPTII,S$GLB,, | Performed by: INTERNAL MEDICINE

## 2023-07-10 PROCEDURE — 3008F PR BODY MASS INDEX (BMI) DOCUMENTED: ICD-10-PCS | Mod: CPTII,S$GLB,, | Performed by: INTERNAL MEDICINE

## 2023-07-10 PROCEDURE — 3074F SYST BP LT 130 MM HG: CPT | Mod: CPTII,S$GLB,, | Performed by: INTERNAL MEDICINE

## 2023-07-10 PROCEDURE — 3078F DIAST BP <80 MM HG: CPT | Mod: CPTII,S$GLB,, | Performed by: INTERNAL MEDICINE

## 2023-07-10 PROCEDURE — 3078F PR MOST RECENT DIASTOLIC BLOOD PRESSURE < 80 MM HG: ICD-10-PCS | Mod: CPTII,S$GLB,, | Performed by: INTERNAL MEDICINE

## 2023-07-10 PROCEDURE — 3008F BODY MASS INDEX DOCD: CPT | Mod: CPTII,S$GLB,, | Performed by: INTERNAL MEDICINE

## 2023-07-10 PROCEDURE — 99999 PR PBB SHADOW E&M-EST. PATIENT-LVL IV: CPT | Mod: PBBFAC,,, | Performed by: INTERNAL MEDICINE

## 2023-07-10 PROCEDURE — 99396 PREV VISIT EST AGE 40-64: CPT | Mod: S$GLB,,, | Performed by: INTERNAL MEDICINE

## 2023-07-10 RX ORDER — SERTRALINE HYDROCHLORIDE 50 MG/1
50 TABLET, FILM COATED ORAL DAILY
Qty: 30 TABLET | Refills: 11 | Status: SHIPPED | OUTPATIENT
Start: 2023-07-10 | End: 2023-08-22

## 2023-07-10 NOTE — PROGRESS NOTES
[facility] Note    Chief Complaint      Chief Complaint   Patient presents with    Annual Exam       History of Present Illness      April Myranda Moser is a 58 y.o. female with chronic conditions of HTN, HLD, anxiety, osteaorthritis, migraines who presents today for: annual preventative visit.  HTN: BP at goal on amlodipine.   HLD: Controlled on pravastatin.  LDL 99.  Anxiety: Doing well on 1/2 celexa. No new or worsening symptoms.    Osteoarthritis, knees: Doing well on meloxicam as needed.  Does have some soreness with walking/hiking.  Had steroid injections with Dr. Heaton.  Migraines: Doing well.  No recent exacerbation     Flu shot UTD.  TdAP 2021.  Shingles vaccine discussed.  Pneumonia vaccine due age 67. COVID UTD.  Mammogram UTD 2022.  PAP smear UTD 2022, Dr. Agarwal.  DEXA 2020, osteopenia, sees Dr. Mondragon.  Cscope 2017 with Dr. Enriquez, no polyps, 10 yr interval.     Past Medical History:  History reviewed. No pertinent past medical history.    Past Surgical History:   has a past surgical history that includes colonosocpy (04/18/2017).    Family History:  family history includes Aneurysm in her father; No Known Problems in her brother, mother, and sister.     Social History:  Social History     Tobacco Use    Smoking status: Never     Passive exposure: Never    Smokeless tobacco: Never   Substance Use Topics    Alcohol use: Yes     Alcohol/week: 1.0 standard drink     Types: 1 Glasses of wine per week    Drug use: Never       I personally reviewed all past medical, surgical, social and family history.    Review of Systems   Constitutional:  Negative for chills, fever and malaise/fatigue.   Respiratory:  Negative for shortness of breath.    Cardiovascular:  Negative for chest pain.   Gastrointestinal:  Negative for constipation, diarrhea, nausea and vomiting.   Skin:  Negative for rash.   Neurological:  Negative for weakness.   All other systems reviewed and are negative.      Medications:  Outpatient Encounter Medications as of 7/10/2023   Medication Sig Dispense Refill    amLODIPine (NORVASC) 5 MG tablet Take 1 tablet (5 mg total) by mouth once daily. 90 tablet 3    doxycycline (VIBRAMYCIN) 100 MG Cap Take 1 capsule (100 mg total) by mouth 2 (two) times daily. for 7 days 14 capsule 0    ergocalciferol (ERGOCALCIFEROL) 50,000 unit Cap Take 1 capsule (50,000 Units total) by mouth every 30 days. 12 capsule 0    FLUCELVAX QUAD 1409-1246, PF, 60 mcg (15 mcg x 4)/0.5 mL Syrg TO BE ADMINISTERED BY PHARMACIST FOR IMMUNIZATION  0    fluticasone propionate (FLONASE) 50 mcg/actuation nasal spray 1 spray (50 mcg total) by Each Nostril route once daily. 9.9 mL 0    LINZESS 145 mcg Cap capsule Take 145 mcg by mouth once daily.      pravastatin (PRAVACHOL) 20 MG tablet Take 1 tablet (20 mg total) by mouth once daily. 90 tablet 2    sertraline (ZOLOFT) 50 MG tablet Take 1 tablet (50 mg total) by mouth once daily. 30 tablet 11    traZODone (DESYREL) 50 MG tablet TAKE 1 TABLET (50 MG TOTAL) BY MOUTH EVERY EVENING. 90 tablet 1    [DISCONTINUED] citalopram (CELEXA) 20 MG tablet Take 1 tablet (20 mg total) by mouth once daily. 90 tablet 3    [DISCONTINUED] citalopram (CELEXA) 20 MG tablet Take 1 tablet (20 mg total) by mouth once daily. 90 tablet 3    [DISCONTINUED] doxycycline (VIBRAMYCIN) 100 MG Cap       [DISCONTINUED] meloxicam (MOBIC) 15 MG tablet TAKE 1 TABLET BY MOUTH EVERY DAY (Patient not taking: Reported on 7/3/2023) 30 tablet 1    [DISCONTINUED] pravastatin (PRAVACHOL) 20 MG tablet TAKE 1 TABLET BY MOUTH EVERY DAY 90 tablet 2     No facility-administered encounter medications on file as of 7/10/2023.       Allergies:  Review of patient's allergies indicates:   Allergen Reactions    Cefdinir Diarrhea and Nausea And Vomiting       Health Maintenance:  Immunization History   Administered Date(s) Administered    COVID-19, MRNA, LN-S, PF (Pfizer) (Purple Cap) 07/30/2021, 08/20/2021    Influenza  "10/23/2019, 09/26/2020    Influenza - Quadrivalent - MDCK - PF 10/23/2019    Influenza - Quadrivalent - PF *Preferred* (6 months and older) 12/18/2014, 12/07/2015, 10/27/2016, 10/30/2017, 10/28/2018, 09/26/2020, 10/28/2021    Td (ADULT) 05/25/2018    Tdap 04/29/2021      Health Maintenance   Topic Date Due    Aspirin/Antiplatelet Therapy  Never done    Mammogram  07/19/2023    Lipid Panel  07/07/2028    TETANUS VACCINE  04/29/2031    Hepatitis C Screening  Completed        Physical Exam      Vital Signs  Pulse: 79  SpO2: 98 %  BP: 108/70  BP Location: Left arm  Patient Position: Sitting  Pain Score: 0-No pain  Height and Weight  Height: 5' 6" (167.6 cm)  Weight: 61.8 kg (136 lb 3.9 oz)  BSA (Calculated - sq m): 1.7 sq meters  BMI (Calculated): 22  Weight in (lb) to have BMI = 25: 154.6]    Physical Exam  Vitals reviewed.   Constitutional:       Appearance: She is well-developed.   HENT:      Head: Normocephalic and atraumatic.      Right Ear: External ear normal.      Left Ear: External ear normal.   Cardiovascular:      Rate and Rhythm: Normal rate and regular rhythm.      Heart sounds: Normal heart sounds. No murmur heard.  Pulmonary:      Effort: Pulmonary effort is normal.      Breath sounds: Normal breath sounds. No wheezing or rales.   Abdominal:      General: Bowel sounds are normal. There is no distension.      Palpations: Abdomen is soft.      Tenderness: There is no abdominal tenderness.        Laboratory:  CBC:  Recent Labs   Lab 04/14/21  0636 06/25/22  0914 07/07/23  0640   WBC 6.4 8.3 9.5   RBC 4.45 4.84 4.88   Hemoglobin 12.8 13.9 14.3   Hematocrit 39.8 42.4 43.0   Platelets 291 325 356   MCV 89.4 87.6 88.1   MCH 28.8 28.7 29.3   MCHC 32.2 32.8 33.3     CMP:  Recent Labs   Lab 10/22/21  0738 06/25/22  0914 07/07/23  0640   Glucose 103 H 93 93   Calcium 9.9 9.8 9.5   Albumin  --  4.7 4.5   ALBUMIN 5.0  --   --    Total Protein 7.4 7.3 6.9   Sodium 144 141 139   Potassium 4.9 4.5 4.0   CO2 28 31 30 "   Chloride 104 103 102   BUN 17 15 16   Alkaline Phosphatase 113  --   --    ALT 12 12 14   AST 16 16 15   Total Bilirubin 1.2 1.7 H 1.5 H     URINALYSIS:       LIPIDS:  Recent Labs   Lab 04/14/21  0636 10/22/21  0738 06/25/22  0914 07/07/23  0640   TSH 3.11  --  1.46 1.56   HDL 64 62 69 76   Cholesterol 218 H 207 H 195 199   Triglycerides 175 H 153 H 118 142   LDL Calculated  --  121  --   --    LDL Cholesterol 125 H  --  105 H 99   HDL/Cholesterol Ratio 3.4  --  2.8 2.6   Non-HDL Cholesterol  --  145 H  --   --    Non HDL Chol. (LDL+VLDL) 154 H  --  126 123     TSH:  Recent Labs   Lab 04/14/21  0636 06/25/22  0914 07/07/23  0640   TSH 3.11 1.46 1.56     A1C:        Assessment/Plan     April Myranda Moser is a 58 y.o.female with:    1. Annual physical exam  Discussed diet and exercise, vaccines and cancer screening, risk factors.  Screening labs ordered.     2. Essential hypertension  Continue current meds.    3. Hyperlipidemia, mixed  Continue current meds.    4. Migraine without status migrainosus, not intractable, unspecified migraine type  Continue to monitor.  5. Anxiety  - sertraline (ZOLOFT) 50 MG tablet; Take 1 tablet (50 mg total) by mouth once daily.  Dispense: 30 tablet; Refill: 11  Change to zoloft.  6. Osteopenia, unspecified location    7. Primary insomnia    8. Primary osteoarthritis of both knees       Chronic conditions status updated as per HPI.  Other than changes above, cont current medications and maintain follow up with specialists.  Follow up in about 1 year (around 7/10/2024) for Annual preventative visit.    No future appointments.    Clarke Almendarez MD  [facility]

## 2023-08-22 ENCOUNTER — TELEPHONE (OUTPATIENT)
Dept: PRIMARY CARE CLINIC | Facility: CLINIC | Age: 58
End: 2023-08-22

## 2023-08-22 ENCOUNTER — PATIENT MESSAGE (OUTPATIENT)
Dept: PRIMARY CARE CLINIC | Facility: CLINIC | Age: 58
End: 2023-08-22

## 2023-08-22 ENCOUNTER — OFFICE VISIT (OUTPATIENT)
Dept: PRIMARY CARE CLINIC | Facility: CLINIC | Age: 58
End: 2023-08-22
Payer: COMMERCIAL

## 2023-08-22 DIAGNOSIS — M85.80 OSTEOPENIA, UNSPECIFIED LOCATION: ICD-10-CM

## 2023-08-22 DIAGNOSIS — F41.9 ANXIETY: Primary | ICD-10-CM

## 2023-08-22 PROCEDURE — 99214 OFFICE O/P EST MOD 30 MIN: CPT | Mod: 95,,, | Performed by: INTERNAL MEDICINE

## 2023-08-22 PROCEDURE — 99214 PR OFFICE/OUTPT VISIT, EST, LEVL IV, 30-39 MIN: ICD-10-PCS | Mod: 95,,, | Performed by: INTERNAL MEDICINE

## 2023-08-22 RX ORDER — CITALOPRAM 10 MG/1
10 TABLET ORAL DAILY
Qty: 90 TABLET | Refills: 3 | Status: SHIPPED | OUTPATIENT
Start: 2023-08-22 | End: 2024-08-21

## 2023-08-22 RX ORDER — BUSPIRONE HYDROCHLORIDE 5 MG/1
5 TABLET ORAL 2 TIMES DAILY
Qty: 60 TABLET | Refills: 11 | Status: SHIPPED | OUTPATIENT
Start: 2023-08-22 | End: 2024-01-12

## 2023-08-22 NOTE — TELEPHONE ENCOUNTER
----- Message from Clarke Almendarez MD sent at 8/22/2023 11:54 AM CDT -----  Please schedule 1 month virtual and assist with endo referral

## 2023-08-22 NOTE — PROGRESS NOTES
[facility] Virtual Visit Note    The patient location is: [place]  The chief complaint leading to consultation is: follow up anxiety  Visit type: Virtual visit with synchronous audio and video  Total time spent with patient: 20 min  Each patient to whom he or she provides medical services by telemedicine is:  (1) informed of the relationship between the physician and patient and the respective role of any other health care provider with respect to management of the patient; and (2) notified that he or she may decline to receive medical services by telemedicine and may withdraw from such care at any time.      Chief Complaint    No chief complaint on file.      History of Present Illness      April Myranda Moser is a 58 y.o. female with chronic conditions of HTN, HLD, anxiety, osteaorthritis, migraines who presents today for: follow up anxiety and depression.  Tried changing celexa to zoloft but caused lethargy, more sadness, more anxiety.  Pt discontinued zoloft and restarted celexa.  Also previously saw Nephrology, Dr. Mondragon, for mild osteopenia.  Patient needs to establish with new provider to monitor.    Past Medical History:  No past medical history on file.    Past Surgical History:   has a past surgical history that includes colonosocpy (04/18/2017).    Family History:  family history includes Aneurysm in her father; No Known Problems in her brother, mother, and sister.     Social History:  Social History     Tobacco Use    Smoking status: Never     Passive exposure: Never    Smokeless tobacco: Never   Substance Use Topics    Alcohol use: Yes     Alcohol/week: 1.0 standard drink of alcohol     Types: 1 Glasses of wine per week    Drug use: Never       Review of Systems   Constitutional:  Negative for chills, fever and malaise/fatigue.   Respiratory:  Negative for shortness of breath.    Cardiovascular:  Negative for chest pain.   Gastrointestinal:  Negative for constipation, diarrhea, nausea and  vomiting.   Skin:  Negative for rash.   Neurological:  Negative for weakness.   All other systems reviewed and are negative.       Medications:  Outpatient Encounter Medications as of 8/22/2023   Medication Sig Dispense Refill    amLODIPine (NORVASC) 5 MG tablet Take 1 tablet (5 mg total) by mouth once daily. 90 tablet 3    busPIRone (BUSPAR) 5 MG Tab Take 1 tablet (5 mg total) by mouth 2 (two) times daily. 60 tablet 11    citalopram (CELEXA) 10 MG tablet Take 1 tablet (10 mg total) by mouth once daily. 90 tablet 3    ergocalciferol (ERGOCALCIFEROL) 50,000 unit Cap Take 1 capsule (50,000 Units total) by mouth every 30 days. 12 capsule 0    FLUCELVAX QUAD 5335-1013, PF, 60 mcg (15 mcg x 4)/0.5 mL Syrg TO BE ADMINISTERED BY PHARMACIST FOR IMMUNIZATION  0    fluticasone propionate (FLONASE) 50 mcg/actuation nasal spray 1 spray (50 mcg total) by Each Nostril route once daily. 9.9 mL 0    LINZESS 145 mcg Cap capsule Take 145 mcg by mouth once daily.      pravastatin (PRAVACHOL) 20 MG tablet Take 1 tablet (20 mg total) by mouth once daily. 90 tablet 2    traZODone (DESYREL) 50 MG tablet TAKE 1 TABLET (50 MG TOTAL) BY MOUTH EVERY EVENING. 90 tablet 1    [DISCONTINUED] sertraline (ZOLOFT) 50 MG tablet Take 1 tablet (50 mg total) by mouth once daily. 30 tablet 11     No facility-administered encounter medications on file as of 8/22/2023.       Allergies:  Review of patient's allergies indicates:   Allergen Reactions    Cefdinir Diarrhea and Nausea And Vomiting       Health Maintenance:  Immunization History   Administered Date(s) Administered    COVID-19, MRNA, LN-S, PF (Pfizer) (Purple Cap) 07/30/2021, 08/20/2021    COVID-19, mRNA, LNP-S, bivalent booster, PF (PFIZER OMICRON) 12/17/2022    Influenza 10/23/2019, 09/26/2020    Influenza - Quadrivalent - MDCK - PF 10/23/2019    Influenza - Quadrivalent - PF *Preferred* (6 months and older) 12/18/2014, 12/07/2015, 10/27/2016, 10/30/2017, 10/28/2018, 09/26/2020, 10/28/2021    Td  (ADULT) 05/25/2018    Tdap 04/29/2021      Health Maintenance   Topic Date Due    Aspirin/Antiplatelet Therapy  Never done    Shingles Vaccine (1 of 2) Never done    Mammogram  07/19/2023    Colorectal Cancer Screening  04/18/2027    Lipid Panel  07/07/2028    TETANUS VACCINE  04/29/2031    Hepatitis C Screening  Completed        Physical Exam       ]    Physical Exam  Vitals reviewed.   Constitutional:       Appearance: She is well-developed.   HENT:      Head: Normocephalic and atraumatic.      Right Ear: External ear normal.      Left Ear: External ear normal.   Cardiovascular:      Rate and Rhythm: Normal rate and regular rhythm.      Heart sounds: Normal heart sounds. No murmur heard.  Pulmonary:      Effort: Pulmonary effort is normal.      Breath sounds: Normal breath sounds. No wheezing or rales.   Abdominal:      General: Bowel sounds are normal. There is no distension.      Palpations: Abdomen is soft.      Tenderness: There is no abdominal tenderness.          Laboratory:  CBC:  Recent Labs   Lab 04/14/21  0636 06/25/22  0914 07/07/23  0640   WBC 6.4 8.3 9.5   RBC 4.45 4.84 4.88   Hemoglobin 12.8 13.9 14.3   Hematocrit 39.8 42.4 43.0   Platelets 291 325 356   MCV 89.4 87.6 88.1   MCH 28.8 28.7 29.3   MCHC 32.2 32.8 33.3     CMP:  Recent Labs   Lab 10/22/21  0738 06/25/22  0914 07/07/23  0640   Glucose 103 H 93 93   Calcium 9.9 9.8 9.5   Albumin  --  4.7 4.5   ALBUMIN 5.0  --   --    Total Protein 7.4 7.3 6.9   Sodium 144 141 139   Potassium 4.9 4.5 4.0   CO2 28 31 30   Chloride 104 103 102   BUN 17 15 16   Alkaline Phosphatase 113  --   --    ALT 12 12 14   AST 16 16 15   Total Bilirubin 1.2 1.7 H 1.5 H     URINALYSIS:       LIPIDS:  Recent Labs   Lab 04/14/21  0636 10/22/21  0738 06/25/22  0914 07/07/23  0640   TSH 3.11  --  1.46 1.56   HDL 64 62 69 76   Cholesterol 218 H 207 H 195 199   Triglycerides 175 H 153 H 118 142   LDL Calculated  --  121  --   --    LDL Cholesterol 125 H  --  105 H 99    HDL/Cholesterol Ratio 3.4  --  2.8 2.6   Non-HDL Cholesterol  --  145 H  --   --    Non HDL Chol. (LDL+VLDL) 154 H  --  126 123     TSH:  Recent Labs   Lab 04/14/21  0636 06/25/22  0914 07/07/23  0640   TSH 3.11 1.46 1.56     A1C:        Assessment/Plan     April Myranda Moser is a 58 y.o.female with:    1. Anxiety  - citalopram (CELEXA) 10 MG tablet; Take 1 tablet (10 mg total) by mouth once daily.  Dispense: 90 tablet; Refill: 3  - busPIRone (BUSPAR) 5 MG Tab; Take 1 tablet (5 mg total) by mouth 2 (two) times daily.  Dispense: 60 tablet; Refill: 11  Continue Celexa, start buspirone.  Follow-up in 1 month to recheck.  2. Osteopenia, unspecified location  - Ambulatory referral/consult to Endocrinology; Future   Refer to Endocrinology.    Chronic conditions status updated as per HPI.  Other than changes above, cont current medications and maintain follow up with specialists.  Follow up in about 4 weeks (around 9/19/2023) for Virtual Follow Up.    No future appointments.    Clarke Almendarez MD  [facility]                  Answers submitted by the patient for this visit:  Review of Systems Questionnaire (Submitted on 8/21/2023)  activity change: No  unexpected weight change: No  rhinorrhea: No  trouble swallowing: No  visual disturbance: No  chest tightness: No  polyuria: No  difficulty urinating: No  menstrual problem: No  joint swelling: No  arthralgias: No  confusion: No  dysphoric mood: Yes

## 2023-08-27 ENCOUNTER — OFFICE VISIT (OUTPATIENT)
Dept: URGENT CARE | Facility: CLINIC | Age: 58
End: 2023-08-27
Payer: COMMERCIAL

## 2023-08-27 VITALS
BODY MASS INDEX: 22.75 KG/M2 | WEIGHT: 141.56 LBS | DIASTOLIC BLOOD PRESSURE: 68 MMHG | SYSTOLIC BLOOD PRESSURE: 101 MMHG | OXYGEN SATURATION: 98 % | HEART RATE: 85 BPM | RESPIRATION RATE: 18 BRPM | HEIGHT: 66 IN | TEMPERATURE: 99 F

## 2023-08-27 DIAGNOSIS — U07.1 COVID: Primary | ICD-10-CM

## 2023-08-27 DIAGNOSIS — R11.2 NAUSEA AND VOMITING, UNSPECIFIED VOMITING TYPE: ICD-10-CM

## 2023-08-27 LAB
CTP QC/QA: YES
SARS-COV-2 AG RESP QL IA.RAPID: POSITIVE

## 2023-08-27 PROCEDURE — 87811 SARS-COV-2 COVID19 W/OPTIC: CPT | Mod: QW,S$GLB,, | Performed by: NURSE PRACTITIONER

## 2023-08-27 PROCEDURE — 87811 SARS CORONAVIRUS 2 ANTIGEN POCT, MANUAL READ: ICD-10-PCS | Mod: QW,S$GLB,, | Performed by: NURSE PRACTITIONER

## 2023-08-27 PROCEDURE — 99213 PR OFFICE/OUTPT VISIT, EST, LEVL III, 20-29 MIN: ICD-10-PCS | Mod: S$GLB,,, | Performed by: NURSE PRACTITIONER

## 2023-08-27 PROCEDURE — 99213 OFFICE O/P EST LOW 20 MIN: CPT | Mod: S$GLB,,, | Performed by: NURSE PRACTITIONER

## 2023-08-27 RX ORDER — ONDANSETRON 8 MG/1
8 TABLET, ORALLY DISINTEGRATING ORAL EVERY 8 HOURS PRN
Qty: 12 TABLET | Refills: 0 | Status: SHIPPED | OUTPATIENT
Start: 2023-08-27

## 2023-08-27 NOTE — PROGRESS NOTES
"Subjective:      Patient ID: April Myranda Moser is a 58 y.o. female.    Vitals:  height is 5' 5.98" (1.676 m) and weight is 64.2 kg (141 lb 8.6 oz). Her oral temperature is 98.5 °F (36.9 °C). Her blood pressure is 101/68 and her pulse is 85. Her respiration is 18 and oxygen saturation is 98%.     Chief Complaint: Sore Throat (I was exposed to COVID on Wednesday. Took 2 tests Friday evening and they were negative. I've been haya sore throat and cough since Thursday. I woke up at 3:00 this morning with nausea and vomiting. I also have tenderness in my throat area. - Entered by patient)    57 y/o female presents today c/o sinus pressure, stuffy nose, sore throat and fatigue x2 days. Patient also reports that she vomited once today, and is feeling nauseated.  Patient reports recent exposure to Covid 19, and took a home covid test with negative result. Patient has been taking Claritin, Flonase and Dayquil with little improvement.     Sinus Problem  This is a new problem. The current episode started in the past 7 days. There has been no fever. Associated symptoms include congestion, coughing, headaches, sinus pressure and a sore throat. Pertinent negatives include no chills, diaphoresis, ear pain, hoarse voice, neck pain, shortness of breath, sneezing or swollen glands.       Constitution: Negative for chills and sweating.   HENT:  Positive for congestion, sinus pressure and sore throat. Negative for ear pain.    Neck: Negative for neck pain.   Respiratory:  Positive for cough. Negative for shortness of breath.    Allergic/Immunologic: Negative for sneezing.   Neurological:  Positive for headaches.      Objective:     Physical Exam   Constitutional: She is oriented to person, place, and time. She appears well-developed. She is cooperative.  Non-toxic appearance. She does not appear ill. No distress.   HENT:   Head: Normocephalic.   Ears:   Right Ear: Hearing, tympanic membrane, external ear and ear canal normal.   Left " Ear: Hearing, tympanic membrane, external ear and ear canal normal.   Nose: Congestion present. No mucosal edema, rhinorrhea or nasal deformity. No epistaxis. Right sinus exhibits no maxillary sinus tenderness and no frontal sinus tenderness. Left sinus exhibits no maxillary sinus tenderness and no frontal sinus tenderness.   Mouth/Throat: Uvula is midline and mucous membranes are normal. Mucous membranes are moist. No trismus in the jaw. Normal dentition. No uvula swelling. Posterior oropharyngeal erythema present. No oropharyngeal exudate or posterior oropharyngeal edema. Oropharynx is clear.   Eyes: Lids are normal. No scleral icterus.   Neck: Trachea normal and phonation normal. Neck supple. No edema present. No erythema present. No neck rigidity present.   Cardiovascular: Normal rate, regular rhythm and normal heart sounds.   Pulmonary/Chest: Effort normal and breath sounds normal. No respiratory distress. She has no decreased breath sounds. She has no rhonchi.   Abdominal: Normal appearance. She exhibits no distension. flat abdomen There is no abdominal tenderness.   Musculoskeletal: Normal range of motion.         General: No deformity. Normal range of motion.   Neurological: She is alert and oriented to person, place, and time. She exhibits normal muscle tone. Coordination normal.   Skin: Skin is warm, dry, intact, not diaphoretic and not pale.   Psychiatric: Her speech is normal and behavior is normal. Judgment and thought content normal.   Nursing note and vitals reviewed.    Results for orders placed or performed in visit on 08/27/23   SARS Coronavirus 2 Antigen, POCT Manual Read   Result Value Ref Range    SARS Coronavirus 2 Antigen Positive (A) Negative     Acceptable Yes       Assessment:     1. COVID    2. Nausea and vomiting, unspecified vomiting type        Plan:   Pt declines paxlovid at this time     COVID  -     SARS Coronavirus 2 Antigen, POCT Manual Read    Nausea and vomiting,  unspecified vomiting type  -     ondansetron (ZOFRAN-ODT) 8 MG TbDL; Take 1 tablet (8 mg total) by mouth every 8 (eight) hours as needed (nause or vomiting).  Dispense: 12 tablet; Refill: 0    Oral fluids  Rest  Steam (hot showers, hot tea)  Blow nose often  Droplet and contact precautions  Self quarantine x 5 days-ok to come out of quarantine as long as symptoms are improving on day 6  Continue to wear mask for 10 days  OTC ibuprofen or tylenol for aches and/or fever   Follow up with worsening symptoms  Seek ER care with symptoms such as wheeze, respiratory distress, lethargy, dehydration

## 2023-08-27 NOTE — PATIENT INSTRUCTIONS
Oral fluids  Rest  Steam (hot showers, hot tea)  Blow nose often  Droplet and contact precautions  Self quarantine x 5 days-ok to come out of quarantine as long as symptoms are improving on day 6  Continue to wear mask for 10 days  OTC ibuprofen or tylenol for aches and/or fever   Follow up with worsening symptoms  Seek ER care with symptoms such as wheeze, respiratory distress, lethargy, dehydration

## 2023-09-07 ENCOUNTER — OFFICE VISIT (OUTPATIENT)
Dept: URGENT CARE | Facility: CLINIC | Age: 58
End: 2023-09-07
Payer: COMMERCIAL

## 2023-09-07 VITALS
SYSTOLIC BLOOD PRESSURE: 126 MMHG | TEMPERATURE: 98 F | DIASTOLIC BLOOD PRESSURE: 80 MMHG | HEIGHT: 65 IN | HEART RATE: 70 BPM | RESPIRATION RATE: 18 BRPM | BODY MASS INDEX: 22.66 KG/M2 | WEIGHT: 136 LBS | OXYGEN SATURATION: 70 %

## 2023-09-07 DIAGNOSIS — N39.0 URINARY TRACT INFECTION WITHOUT HEMATURIA, SITE UNSPECIFIED: Primary | ICD-10-CM

## 2023-09-07 LAB
BILIRUB UR QL STRIP: POSITIVE
GLUCOSE UR QL STRIP: NEGATIVE
KETONES UR QL STRIP: NEGATIVE
LEUKOCYTE ESTERASE UR QL STRIP: POSITIVE
PH, POC UA: 5.5 (ref 5–8)
POC BLOOD, URINE: POSITIVE
POC NITRATES, URINE: POSITIVE
PROT UR QL STRIP: POSITIVE
SP GR UR STRIP: 1.01 (ref 1–1.03)
UROBILINOGEN UR STRIP-ACNC: POSITIVE (ref 0.1–1.1)

## 2023-09-07 PROCEDURE — 99213 PR OFFICE/OUTPT VISIT, EST, LEVL III, 20-29 MIN: ICD-10-PCS | Mod: S$GLB,,, | Performed by: FAMILY MEDICINE

## 2023-09-07 PROCEDURE — 81003 POCT URINALYSIS, DIPSTICK, AUTOMATED, W/O SCOPE: ICD-10-PCS | Mod: QW,S$GLB,, | Performed by: FAMILY MEDICINE

## 2023-09-07 PROCEDURE — 99213 OFFICE O/P EST LOW 20 MIN: CPT | Mod: S$GLB,,, | Performed by: FAMILY MEDICINE

## 2023-09-07 PROCEDURE — 81003 URINALYSIS AUTO W/O SCOPE: CPT | Mod: QW,S$GLB,, | Performed by: FAMILY MEDICINE

## 2023-09-07 RX ORDER — NITROFURANTOIN 25; 75 MG/1; MG/1
100 CAPSULE ORAL 2 TIMES DAILY
Qty: 14 CAPSULE | Refills: 0 | Status: SHIPPED | OUTPATIENT
Start: 2023-09-07 | End: 2023-09-14

## 2023-09-07 RX ORDER — AMLODIPINE BESYLATE 5 MG/1
1 TABLET ORAL DAILY
COMMUNITY
End: 2024-01-12 | Stop reason: SDUPTHER

## 2023-09-07 RX ORDER — TRAZODONE HYDROCHLORIDE 50 MG/1
TABLET ORAL
COMMUNITY

## 2023-09-07 NOTE — PROGRESS NOTES
"Subjective:      Patient ID: April Myranda Moser is a 58 y.o. female.    Vitals:  height is 5' 5" (1.651 m) and weight is 61.7 kg (136 lb). Her oral temperature is 98.3 °F (36.8 °C). Her blood pressure is 126/80 and her pulse is 70. Her respiration is 18 and oxygen saturation is 70% (abnormal).     Chief Complaint: Urinary Tract Infection (Entered by patient)    This is a 58 y.o. female who presents today with a chief complaint of  lower back pain, difficulty in urinating, burning last Saturday, and increase in urination with little coming out.  Patient states she was Dx'd with COVID last week.  She wasn't drinking enough.  She has been taking AZO with some relief.     Urinary Tract Infection   This is a new problem. The current episode started in the past 7 days. The problem occurs every urination. The problem has been gradually worsening. The quality of the pain is described as burning. The pain is at a severity of 8/10. The pain is severe. There has been no fever. She is Not sexually active. There is No history of pyelonephritis. Associated symptoms include flank pain. Treatments tried: AZO.       Genitourinary:  Positive for flank pain.      Objective:     Physical Exam   Constitutional: She does not appear ill. No distress. normal  HENT:   Head: Normocephalic and atraumatic.   Cardiovascular: Normal rate, regular rhythm, normal heart sounds and normal pulses.   Pulmonary/Chest: Effort normal and breath sounds normal.   Abdominal: Normal appearance. There is no left CVA tenderness and no right CVA tenderness.   Neurological: She is alert.   Nursing note and vitals reviewed.    Results for orders placed or performed in visit on 09/07/23   POCT Urinalysis, Dipstick, Automated, W/O Scope   Result Value Ref Range    POC Blood, Urine Positive (A) Negative    POC Bilirubin, Urine Positive (A) Negative    POC Urobilinogen, Urine Positive 0.1 - 1.1    POC Ketones, Urine Negative Negative    POC Protein, Urine Positive " (A) Negative    POC Nitrates, Urine Positive (A) Negative    POC Glucose, Urine Negative Negative    pH, UA 5.5 5 - 8    POC Specific Gravity, Urine 1.015 1.003 - 1.029    POC Leukocytes, Urine Positive (A) Negative        Assessment:     1. Urinary tract infection without hematuria, site unspecified        Plan:       Urinary tract infection without hematuria, site unspecified  -     POCT Urinalysis, Dipstick, Automated, W/O Scope  -     nitrofurantoin, macrocrystal-monohydrate, (MACROBID) 100 MG capsule; Take 1 capsule (100 mg total) by mouth 2 (two) times daily. for 7 days  Dispense: 14 capsule; Refill: 0    Increased fluids. RTC prn

## 2023-11-18 DIAGNOSIS — M85.80 OSTEOPENIA, UNSPECIFIED LOCATION: ICD-10-CM

## 2023-11-20 RX ORDER — ERGOCALCIFEROL 1.25 MG/1
CAPSULE ORAL
Qty: 3 CAPSULE | Refills: 1 | Status: SHIPPED | OUTPATIENT
Start: 2023-11-20

## 2024-01-11 NOTE — PROGRESS NOTES
ENDOCRINOLOGY CLINIC NEW PATIENT NOTE    Subjective:      Patient ID: Jane Moser is referred by Clarke Almendarez MD     Chief Complaint:  Osteopenia    HPI:   Jane Moser is a 58 y.o. female who presents for evaluation of osteopenia.     Patient had been following up with her nephrologist at [facility] for osteopenia.   Reports last DXA scan in 2021 - not available for review at this time.     Lab Results   Component Value Date    CALCIUM 9.5 07/07/2023    ALBUMIN 4.5 07/07/2023    CREATININE 0.88 07/07/2023    ALKPHOS 113 10/22/2021    TSH 1.56 07/07/2023    EGFRNORACEVR 77 07/07/2023            Current/Previous treatment: Denies    History of previous fracture: Left wrist fracture in 2021, tripped over the brick and fell.     Parent with fractured hip: No  Smoking status: No  Glucocorticoid use: No  History of RA: No  Alcohol 3 or more/day: No  Nephrolithiasis: No  Diabetes: No  Frequent falls: No  Loss of height: No  Menopause: 49 yo, no HRT  CKD: No, CKD stage  Cancer/XRT: No    Recurrent UTI every 3 months, currently on estradiol. Admission for acute pyelonephritis in 11/2023.     Dental visits:  Every 6 months. No current dental procedures planned.    GERD: Denies  PUD: Denies    Supplements:   Ca: MVI and takes yogurt 1-2 days times and cheese regularly.    VitD: 60628 IU weekly since 2020    Exercise: 5x week, 30-40 mins treadmill and floor exercises      Family history:   Osteoporosis: Denies  Hyperparathyroidism: Denies  Thyroid: Sister    ROS: see HPI     Objective:     Physical Exam     /80 (BP Location: Left arm, Patient Position: Sitting, BP Method: Medium (Automatic))   Pulse 64   Wt 62.6 kg (138 lb 0.1 oz)   SpO2 97%   BMI 22.97 kg/m²     Wt Readings from Last 3 Encounters:   01/12/24 62.6 kg (138 lb 0.1 oz)   09/07/23 61.7 kg (136 lb)   08/27/23 64.2 kg (141 lb 8.6 oz)       Constitutional:  Pleasant, in no acute distress.   HENT:   Head:    Normocephalic and atraumatic.    Eyes:    EOMI. No scleral icterus.   Cardiovascular:  Normal rate  Respiratory:   Effort normal   Neurological:  No tremor, normal speech      LABORATORY REVIEW:    Chemistry        Component Value Date/Time     07/07/2023 0640    K 4.0 07/07/2023 0640     07/07/2023 0640    CO2 30 07/07/2023 0640    BUN 16 07/07/2023 0640    BUN 17 10/22/2021 0738    CREATININE 0.88 07/07/2023 0640    GLU 93 07/07/2023 0640        Component Value Date/Time    CALCIUM 9.5 07/07/2023 0640    ALKPHOS 113 10/22/2021 0738    AST 15 07/07/2023 0640    ALT 14 07/07/2023 0640    BILITOT 1.5 (H) 07/07/2023 0640    ESTGFRAFRICA 83 06/25/2022 0914    EGFRNONAA 72 06/25/2022 0914            Assessment/Plan:     Problem List Items Addressed This Visit          Endocrine    Vitamin D deficiency       Currently on vitamin D 33010 IU weekly.   Repeat vitamin D.             Orthopedic    Osteopenia - Primary       Reports left wrist fracture in 2021.  No prior treatment for osteopenia.   Patient is due for her DXA scan and further management after the results.      Encouraged safe movements and fall precautions  Continue routine dental visits  Instructed on Calcium and vitamin D   Labs ordered.                Relevant Orders    Vitamin D    Albumin    Calcium    Creatinine, Serum    DXA Bone Density Axial Skeleton 1 or more sites      Vandana Lacy MD

## 2024-01-12 ENCOUNTER — OFFICE VISIT (OUTPATIENT)
Dept: ENDOCRINOLOGY | Facility: CLINIC | Age: 59
End: 2024-01-12
Payer: COMMERCIAL

## 2024-01-12 ENCOUNTER — LAB VISIT (OUTPATIENT)
Dept: LAB | Facility: HOSPITAL | Age: 59
End: 2024-01-12
Attending: INTERNAL MEDICINE
Payer: COMMERCIAL

## 2024-01-12 VITALS
HEART RATE: 64 BPM | BODY MASS INDEX: 22.97 KG/M2 | SYSTOLIC BLOOD PRESSURE: 127 MMHG | WEIGHT: 138 LBS | DIASTOLIC BLOOD PRESSURE: 80 MMHG | OXYGEN SATURATION: 97 %

## 2024-01-12 DIAGNOSIS — E55.9 VITAMIN D DEFICIENCY: ICD-10-CM

## 2024-01-12 DIAGNOSIS — M85.80 OSTEOPENIA, UNSPECIFIED LOCATION: Primary | ICD-10-CM

## 2024-01-12 DIAGNOSIS — M85.80 OSTEOPENIA, UNSPECIFIED LOCATION: ICD-10-CM

## 2024-01-12 PROBLEM — M17.9 OSTEOARTHRITIS OF KNEE: Status: ACTIVE | Noted: 2024-01-12

## 2024-01-12 PROBLEM — M67.50 PLICA SYNDROME: Status: ACTIVE | Noted: 2024-01-12

## 2024-01-12 PROBLEM — F32.A DEPRESSIVE DISORDER: Status: ACTIVE | Noted: 2023-11-21

## 2024-01-12 LAB
25(OH)D3+25(OH)D2 SERPL-MCNC: 35 NG/ML (ref 30–96)
ALBUMIN SERPL BCP-MCNC: 4.2 G/DL (ref 3.5–5.2)
CALCIUM SERPL-MCNC: 9.8 MG/DL (ref 8.7–10.5)
CREAT SERPL-MCNC: 1 MG/DL (ref 0.5–1.4)
EST. GFR  (NO RACE VARIABLE): >60 ML/MIN/1.73 M^2

## 2024-01-12 PROCEDURE — 99204 OFFICE O/P NEW MOD 45 MIN: CPT | Mod: S$GLB,,, | Performed by: INTERNAL MEDICINE

## 2024-01-12 PROCEDURE — 1160F RVW MEDS BY RX/DR IN RCRD: CPT | Mod: CPTII,S$GLB,, | Performed by: INTERNAL MEDICINE

## 2024-01-12 PROCEDURE — 1159F MED LIST DOCD IN RCRD: CPT | Mod: CPTII,S$GLB,, | Performed by: INTERNAL MEDICINE

## 2024-01-12 PROCEDURE — 3008F BODY MASS INDEX DOCD: CPT | Mod: CPTII,S$GLB,, | Performed by: INTERNAL MEDICINE

## 2024-01-12 PROCEDURE — 3074F SYST BP LT 130 MM HG: CPT | Mod: CPTII,S$GLB,, | Performed by: INTERNAL MEDICINE

## 2024-01-12 PROCEDURE — 99999 PR PBB SHADOW E&M-EST. PATIENT-LVL IV: CPT | Mod: PBBFAC,,, | Performed by: INTERNAL MEDICINE

## 2024-01-12 PROCEDURE — 82306 VITAMIN D 25 HYDROXY: CPT | Performed by: INTERNAL MEDICINE

## 2024-01-12 PROCEDURE — 36415 COLL VENOUS BLD VENIPUNCTURE: CPT | Performed by: INTERNAL MEDICINE

## 2024-01-12 PROCEDURE — 82040 ASSAY OF SERUM ALBUMIN: CPT | Performed by: INTERNAL MEDICINE

## 2024-01-12 PROCEDURE — 82565 ASSAY OF CREATININE: CPT | Performed by: INTERNAL MEDICINE

## 2024-01-12 PROCEDURE — 3079F DIAST BP 80-89 MM HG: CPT | Mod: CPTII,S$GLB,, | Performed by: INTERNAL MEDICINE

## 2024-01-12 PROCEDURE — 82310 ASSAY OF CALCIUM: CPT | Performed by: INTERNAL MEDICINE

## 2024-01-12 RX ORDER — ESTRADIOL 0.1 MG/G
CREAM VAGINAL
COMMUNITY
Start: 2023-12-07

## 2024-01-12 NOTE — PATIENT INSTRUCTIONS
Take calcium total 1200 mg a day from diet and supplements.   Avoid bending forwards at the waist and deep twisting.  Continue weight bearing exercises like walking and do light weights.  Take fall precautions.  Call if you have any new fractures.  Get regular dental visit and let your dentist know that you are on osteoporosis medication.      Safe exercises/Movement:    [email or web address]

## 2024-01-12 NOTE — Clinical Note
----- Message from Elver Maloney MD sent at 5/20/2018  1:20 PM CDT -----  Normal and can not leave message since mailbox is full     DXA scan at [place] after 1/22/2024.  Labs today.

## 2024-01-12 NOTE — ASSESSMENT & PLAN NOTE
Reports left wrist fracture in 2021.  No prior treatment for osteopenia.   Patient is due for her DXA scan and further management after the results.      Encouraged safe movements and fall precautions  Continue routine dental visits  Instructed on Calcium and vitamin D   Labs ordered.

## 2024-01-26 ENCOUNTER — HOSPITAL ENCOUNTER (OUTPATIENT)
Dept: RADIOLOGY | Facility: HOSPITAL | Age: 59
Discharge: HOME | End: 2024-01-26
Attending: INTERNAL MEDICINE
Payer: COMMERCIAL

## 2024-01-26 DIAGNOSIS — M85.80 OSTEOPENIA, UNSPECIFIED LOCATION: ICD-10-CM

## 2024-01-26 PROCEDURE — 77080 DXA BONE DENSITY AXIAL: CPT | Mod: 26,,, | Performed by: INTERNAL MEDICINE

## 2024-01-26 PROCEDURE — 77080 DXA BONE DENSITY AXIAL: CPT | Mod: TC

## 2024-02-18 DIAGNOSIS — I10 ESSENTIAL HYPERTENSION: ICD-10-CM

## 2024-02-18 RX ORDER — AMLODIPINE BESYLATE 5 MG/1
5 TABLET ORAL DAILY
Qty: 90 TABLET | Refills: 1 | Status: SHIPPED | OUTPATIENT
Start: 2024-02-18

## 2024-02-18 NOTE — TELEPHONE ENCOUNTER
No care due was identified.  [facility] Embedded Care Due Messages. Reference number: [id number].   2/18/2024 11:47:40 AM CST

## 2024-02-19 NOTE — TELEPHONE ENCOUNTER
Refill Decision Note   Jane Moser  is requesting a refill authorization.  Brief Assessment and Rationale for Refill:  Approve     Medication Therapy Plan:        Comments:     Note composed:8:44 PM 02/18/2024

## 2024-03-08 LAB
CHOLEST SERPL-MSCNC: 194 MG/DL (ref 0–200)
EGFR: 85
HDLC SERPL-MCNC: 78 MG/DL (ref 35–70)
LDLC SERPL CALC-MCNC: 96 MG/DL (ref 0–160)
TRIGL SERPL-MCNC: 104 MG/DL (ref 40–160)

## 2024-03-12 ENCOUNTER — PATIENT MESSAGE (OUTPATIENT)
Dept: PRIMARY CARE CLINIC | Facility: CLINIC | Age: 59
End: 2024-03-12
Payer: COMMERCIAL

## 2024-03-14 ENCOUNTER — PATIENT OUTREACH (OUTPATIENT)
Dept: ADMINISTRATIVE | Facility: HOSPITAL | Age: 59
End: 2024-03-14
Payer: COMMERCIAL

## 2024-03-14 ENCOUNTER — PATIENT MESSAGE (OUTPATIENT)
Dept: ADMINISTRATIVE | Facility: HOSPITAL | Age: 59
End: 2024-03-14
Payer: COMMERCIAL

## 2024-03-14 NOTE — PROGRESS NOTES
Health Maintenance Due   Topic Date Due    Shingles Vaccine (1 of 2) Never done    Mammogram  07/19/2023    Influenza Vaccine (1) 09/01/2023    COVID-19 Vaccine (4 - 2023-24 season) 09/01/2023     Triggered LINKS and reconciled immunizations. Updated Care Everywhere. Imported outside lab results for lipid panel, eGFR received from [facility] into . Chart review completed.

## 2024-04-03 DIAGNOSIS — Z12.31 OTHER SCREENING MAMMOGRAM: ICD-10-CM

## 2024-04-10 ENCOUNTER — PATIENT MESSAGE (OUTPATIENT)
Dept: ADMINISTRATIVE | Facility: HOSPITAL | Age: 59
End: 2024-04-10
Payer: COMMERCIAL

## 2024-07-12 ENCOUNTER — OFFICE VISIT (OUTPATIENT)
Dept: PRIMARY CARE CLINIC | Facility: CLINIC | Age: 59
End: 2024-07-12
Payer: COMMERCIAL

## 2024-07-12 VITALS
OXYGEN SATURATION: 99 % | HEIGHT: 65 IN | DIASTOLIC BLOOD PRESSURE: 70 MMHG | WEIGHT: 144.38 LBS | SYSTOLIC BLOOD PRESSURE: 100 MMHG | BODY MASS INDEX: 24.06 KG/M2 | HEART RATE: 73 BPM

## 2024-07-12 DIAGNOSIS — M85.80 OSTEOPENIA, UNSPECIFIED LOCATION: ICD-10-CM

## 2024-07-12 DIAGNOSIS — I10 ESSENTIAL HYPERTENSION: ICD-10-CM

## 2024-07-12 DIAGNOSIS — Z12.31 SCREENING MAMMOGRAM, ENCOUNTER FOR: ICD-10-CM

## 2024-07-12 DIAGNOSIS — F41.9 ANXIETY: ICD-10-CM

## 2024-07-12 DIAGNOSIS — Z00.00 ANNUAL PHYSICAL EXAM: Primary | ICD-10-CM

## 2024-07-12 DIAGNOSIS — E78.2 HYPERLIPIDEMIA, MIXED: ICD-10-CM

## 2024-07-12 DIAGNOSIS — G43.909 MIGRAINE WITHOUT STATUS MIGRAINOSUS, NOT INTRACTABLE, UNSPECIFIED MIGRAINE TYPE: ICD-10-CM

## 2024-07-12 DIAGNOSIS — M17.0 PRIMARY OSTEOARTHRITIS OF BOTH KNEES: ICD-10-CM

## 2024-07-12 PROCEDURE — 1159F MED LIST DOCD IN RCRD: CPT | Mod: CPTII,S$GLB,, | Performed by: INTERNAL MEDICINE

## 2024-07-12 PROCEDURE — 3074F SYST BP LT 130 MM HG: CPT | Mod: CPTII,S$GLB,, | Performed by: INTERNAL MEDICINE

## 2024-07-12 PROCEDURE — 3008F BODY MASS INDEX DOCD: CPT | Mod: CPTII,S$GLB,, | Performed by: INTERNAL MEDICINE

## 2024-07-12 PROCEDURE — 99999 PR PBB SHADOW E&M-EST. PATIENT-LVL V: CPT | Mod: PBBFAC,,, | Performed by: INTERNAL MEDICINE

## 2024-07-12 PROCEDURE — 3078F DIAST BP <80 MM HG: CPT | Mod: CPTII,S$GLB,, | Performed by: INTERNAL MEDICINE

## 2024-07-12 PROCEDURE — 99396 PREV VISIT EST AGE 40-64: CPT | Mod: S$GLB,,, | Performed by: INTERNAL MEDICINE

## 2024-07-12 RX ORDER — ERGOCALCIFEROL 1.25 MG/1
50000 CAPSULE ORAL
Qty: 12 CAPSULE | Refills: 1 | Status: SHIPPED | OUTPATIENT
Start: 2024-07-12

## 2024-07-12 NOTE — PROGRESS NOTES
[facility] Note    Chief Complaint      Chief Complaint   Patient presents with    Annual Exam       History of Present Illness      History of Present Illness  The patient presents for annual preventative visit.    The patient reports persistent insomnia, despite taking trazodone at 8:00 PM the previous night. She awakened between 12:00 AM and 1:00 AM, tossing and turning throughout the night. She attributes this to her ongoing life stressors, including recent out-of-town interactions with friends, purchasing a home, and [place] company she has been employed for 28 years. She has recently consulted with Dr. Lacy, who recommended continuation of vitamin D supplementation, however, a prescription was not provided. She requests a refill of this medication.    The patient is contemplating transitioning her gynecologist, having previously consulted with Dr. Villanueva, who has since retired. She was previously under the care of Dr. Alvarado, but has since relocated.    The patient has not undergone a mammogram this year.    Supplemental Information  She continues to take Celexa 10 mg. She denies any recent migraines.  HTN: BP at goal on amlodipine.   HLD: Controlled on pravastatin.  LDL 96.  Anxiety: Doing well on celexa. No new or worsening symptoms.    Osteoarthritis, knees: Doing well on meloxicam as needed.  Does have some soreness with walking/hiking.  Had steroid injections with Dr. Heaton.  Migraines: Doing well.  No recent exacerbation     Flu shot UTD.  TdAP 2021.  Shingles vaccine discussed.  Pneumonia vaccine due age 67. COVID UTD.  Mammogram UTD 2023.  PAP smear UTD 2022, Dr. Agarwal.  DEXA 2020, osteopenia, sees Dr. Mondragon.  Cscope 2017 with Dr. Enriquez, no polyps, 10 yr interval.     Assessment/Plan     April Myranda Moser is a 59 y.o.female with:    Assessment & Plan  1. Insomnia.  A prescription refill for vitamin D 50,000 units, to be taken monthly, has been issued.    2. Health  maintenance.  The patient's blood pressure is well-managed with amlodipine. Her cholesterol levels are satisfactory, with an LDL cholesterol level of 96. Her tetanus vaccination is current until 2031. A recommendation for a shingles vaccine has been made. A mammogram has been ordered. A referral to a gynecologist has been made.    Follow-up  A follow-up visit is scheduled for 1 year from now.    1. Annual physical exam  - Ambulatory referral/consult to Obstetrics / Gynecology; Future  Discussed diet and exercise, vaccines and cancer screening, risk factors.  Screening labs reviewed.     2. Essential hypertension    3. Hyperlipidemia, mixed    4. Anxiety    5. Primary osteoarthritis of both knees    6. Migraine without status migrainosus, not intractable, unspecified migraine type    7. Osteopenia, unspecified location  - ergocalciferol (ERGOCALCIFEROL) 50,000 unit Cap; Take 1 capsule (50,000 Units total) by mouth every 30 days.  Dispense: 12 capsule; Refill: 1    8. Screening mammogram, encounter for  - Mammo Digital Screening Bilat w/ Addi; Future  - Mammo Digital Screening Bilat w/ Addi      Chronic conditions status updated as per HPI.  Other than changes above, cont current medications and maintain follow up with specialists.  Follow up in about 1 year (around 7/12/2025) for Annual preventative visit.    Future Appointments   Date Time Provider Department Center   11/11/2024  9:00 AM Cielo Arreguin MD [facility]           Past Medical History:  Past Medical History:   Diagnosis Date    Hyperlipidemia     Hypertension        Past Surgical History:   has a past surgical history that includes colonosocpy (04/18/2017).    Family History:  family history includes Aneurysm in her father; No Known Problems in her brother and mother; Thyroid disease in her sister.     Social History:  Social History     Tobacco Use    Smoking status: Never     Passive exposure: Never    Smokeless tobacco: Never   Substance  Use Topics    Alcohol use: Yes     Alcohol/week: 1.0 standard drink of alcohol     Types: 1 Glasses of wine per week     Comment: Socially    Drug use: Never       Medications:  Outpatient Encounter Medications as of 7/12/2024   Medication Sig Dispense Refill    amLODIPine (NORVASC) 5 MG tablet Take 1 tablet (5 mg total) by mouth once daily. 90 tablet 1    citalopram (CELEXA) 10 MG tablet Take 1 tablet (10 mg total) by mouth once daily. 90 tablet 3    ergocalciferol (ERGOCALCIFEROL) 50,000 unit Cap Take 1 capsule (50,000 Units total) by mouth every 30 days. 12 capsule 1    estradioL (ESTRACE) 0.01 % (0.1 mg/gram) vaginal cream PLACE PEA SIZED AMOUNT IN VAGINA (1G) DAILY X2 WEEKS THEN MW      FLUCELVAX QUAD 4742-2185, PF, 60 mcg (15 mcg x 4)/0.5 mL Syrg TO BE ADMINISTERED BY PHARMACIST FOR IMMUNIZATION  0    fluticasone propionate (FLONASE) 50 mcg/actuation nasal spray 1 spray (50 mcg total) by Each Nostril route once daily. 9.9 mL 0    ondansetron (ZOFRAN-ODT) 8 MG TbDL Take 1 tablet (8 mg total) by mouth every 8 (eight) hours as needed (nause or vomiting). 12 tablet 0    pravastatin (PRAVACHOL) 20 MG tablet Take 1 tablet (20 mg total) by mouth once daily. 90 tablet 2    traZODone (DESYREL) 50 MG tablet       [DISCONTINUED] ergocalciferol (ERGOCALCIFEROL) 50,000 unit Cap TAKE 1 BY MOUTH EVERY MONTH 3 capsule 1     No facility-administered encounter medications on file as of 7/12/2024.       Allergies:  Review of patient's allergies indicates:   Allergen Reactions    Cefdinir Diarrhea and Nausea And Vomiting       Health Maintenance:  Immunization History   Administered Date(s) Administered    COVID-19, MRNA, LN-S, PF (Pfizer) (Purple Cap) 07/30/2021, 08/20/2021    COVID-19, mRNA, LNP-S, bivalent booster, PF (PFIZER OMICRON) 12/17/2022    Influenza 12/18/2014, 12/07/2015, 10/27/2016, 10/23/2019, 09/26/2020, 12/09/2022    Influenza - Quadrivalent - MDCK - PF 10/23/2019    Influenza - Quadrivalent - PF *Preferred* (6  "months and older) 12/18/2014, 12/07/2015, 10/27/2016, 10/30/2017, 10/28/2018, 09/26/2020, 10/28/2021, 12/08/2022    Influenza - Trivalent - PF (ADULT) 12/18/2014, 12/07/2015, 10/27/2016    Td (ADULT) 05/25/2018    Tdap 04/29/2021      Health Maintenance   Topic Date Due    Shingles Vaccine (1 of 2) Never done    Mammogram  07/19/2023    DEXA Scan  01/26/2026    Colorectal Cancer Screening  04/18/2027    Lipid Panel  03/08/2029    TETANUS VACCINE  04/29/2031    Hepatitis C Screening  Completed        Physical Exam      Vital Signs  Pulse: 73  SpO2: 99 %  BP: 100/70  BP Location: Right arm  Patient Position: Sitting  Pain Score: 0-No pain  Height and Weight  Height: 5' 5" (165.1 cm)  Weight: 65.5 kg (144 lb 6.4 oz)  BSA (Calculated - sq m): 1.73 sq meters  BMI (Calculated): 24  Weight in (lb) to have BMI = 25: 149.9]    Physical Exam  Carotid arteries are normal.  Lungs are normal.  Heart is normal.    Physical Exam  Vitals reviewed.   Constitutional:       Appearance: She is well-developed.   HENT:      Head: Normocephalic and atraumatic.      Right Ear: External ear normal.      Left Ear: External ear normal.   Cardiovascular:      Rate and Rhythm: Normal rate and regular rhythm.      Heart sounds: Normal heart sounds. No murmur heard.  Pulmonary:      Effort: Pulmonary effort is normal.      Breath sounds: Normal breath sounds. No wheezing or rales.   Abdominal:      General: Bowel sounds are normal. There is no distension.      Palpations: Abdomen is soft.      Tenderness: There is no abdominal tenderness.         Laboratory:    Results  Laboratory Studies  LDL cholesterol was 96.    CBC:  Recent Labs   Lab 06/25/22  0914 07/07/23  0640   WBC 8.3 9.5   RBC 4.84 4.88   Hemoglobin 13.9 14.3   Hematocrit 42.4 43.0   Platelets 325 356   MCV 87.6 88.1   MCH 28.7 29.3   MCHC 32.8 33.3     CMP:  Recent Labs   Lab 10/22/21  0738 10/22/21  0738 06/25/22  0914 07/07/23  0640 01/12/24  1118   Glucose 103 H  --  93 93  --  "   Calcium 9.9  --  9.8 9.5 9.8   Albumin  --    < > 4.7 4.5 4.2   ALBUMIN 5.0  --   --   --   --    Total Protein 7.4   < > 7.3 6.9  --    Sodium 144  --  141 139  --    Potassium 4.9  --  4.5 4.0  --    CO2 28  --  31 30  --    Chloride 104  --  103 102  --    BUN 17   < > 15 16  --    Alkaline Phosphatase 113  --   --   --   --    ALT 12  --  12 14  --    AST 16  --  16 15  --    Total Bilirubin 1.2  --  1.7 H 1.5 H  --     < > = values in this interval not displayed.     URINALYSIS:  Recent Labs   Lab 09/07/23  1747   pH, UA 5.5      LIPIDS:  Recent Labs   Lab 10/22/21  0738 06/25/22  0914 07/07/23  0640 03/08/24  0000   TSH  --  1.46 1.56  --    HDL 62 69 76 78 A   Cholesterol 207 H 195 199 194   Triglycerides 153 H 118 142 104   LDL Cholesterol  --  105 H 99  --    LDL Calculated 121  --   --  96   HDL/Cholesterol Ratio  --  2.8 2.6  --    Non-HDL Cholesterol 145 H  --   --   --    Non HDL Chol. (LDL+VLDL)  --  126 123  --      TSH:  Recent Labs   Lab 06/25/22  0914 07/07/23  0640   TSH 1.46 1.56     A1C:            This note was generated with the assistance of ambient listening technology. Verbal consent was obtained by the patient and accompanying visitor(s) for the recording of patient appointment to facilitate this note. I attest to having reviewed and edited the generated note for accuracy, though some syntax or spelling errors may persist. Please contact the author of this note for any clarification.      Clarke Almendarez MD  [facility]

## 2024-08-01 RX ORDER — PRAVASTATIN SODIUM 20 MG/1
20 TABLET ORAL DAILY
Qty: 90 TABLET | Refills: 3 | Status: SHIPPED | OUTPATIENT
Start: 2024-08-01

## 2024-08-01 NOTE — TELEPHONE ENCOUNTER
Care Due:                  Date            Visit Type   Department     Provider  --------------------------------------------------------------------------------                                EP -                              PRIMARY      [facility]  Last Visit: 07-      CARE ([facility])   CARE           Clarke Dooley  Next Visit: None Scheduled  None         None Found                                                            Last  Test          Frequency    Reason                     Performed    Due Date  --------------------------------------------------------------------------------    CMP.........  12 months..  pravastatin..............  07- 07-    [facility] Embedded Care Due Messages. Reference number: [id number].   7/31/2024 8:14:52 PM CDT

## 2024-08-30 DIAGNOSIS — I10 ESSENTIAL HYPERTENSION: ICD-10-CM

## 2024-08-30 RX ORDER — AMLODIPINE BESYLATE 5 MG/1
5 TABLET ORAL DAILY
Qty: 90 TABLET | Refills: 3 | Status: SHIPPED | OUTPATIENT
Start: 2024-08-30

## 2024-08-30 NOTE — TELEPHONE ENCOUNTER
No care due was identified.  [facility] Embedded Care Due Messages. Reference number: [id number].   8/30/2024 7:40:12 AM CDT

## 2024-09-23 LAB — BCS RECOMMENDATION EXT: NORMAL

## 2024-09-25 ENCOUNTER — LAB VISIT (OUTPATIENT)
Dept: LAB | Facility: HOSPITAL | Age: 59
End: 2024-09-25
Attending: FAMILY MEDICINE
Payer: COMMERCIAL

## 2024-09-25 ENCOUNTER — OFFICE VISIT (OUTPATIENT)
Dept: PRIMARY CARE CLINIC | Facility: CLINIC | Age: 59
End: 2024-09-25
Payer: COMMERCIAL

## 2024-09-25 ENCOUNTER — PATIENT OUTREACH (OUTPATIENT)
Dept: ADMINISTRATIVE | Facility: HOSPITAL | Age: 59
End: 2024-09-25
Payer: COMMERCIAL

## 2024-09-25 VITALS
OXYGEN SATURATION: 94 % | SYSTOLIC BLOOD PRESSURE: 124 MMHG | WEIGHT: 142.88 LBS | HEIGHT: 66 IN | HEART RATE: 83 BPM | BODY MASS INDEX: 22.96 KG/M2 | DIASTOLIC BLOOD PRESSURE: 82 MMHG

## 2024-09-25 DIAGNOSIS — R11.14 BILIOUS VOMITING WITH NAUSEA: Primary | ICD-10-CM

## 2024-09-25 DIAGNOSIS — K57.90 DIVERTICULOSIS: ICD-10-CM

## 2024-09-25 DIAGNOSIS — J34.89 SINUS PRESSURE: ICD-10-CM

## 2024-09-25 DIAGNOSIS — K59.00 CONSTIPATION, UNSPECIFIED CONSTIPATION TYPE: ICD-10-CM

## 2024-09-25 DIAGNOSIS — R10.9 ABDOMINAL PAIN, UNSPECIFIED ABDOMINAL LOCATION: ICD-10-CM

## 2024-09-25 DIAGNOSIS — R11.14 BILIOUS VOMITING WITH NAUSEA: ICD-10-CM

## 2024-09-25 DIAGNOSIS — I10 ESSENTIAL HYPERTENSION: ICD-10-CM

## 2024-09-25 DIAGNOSIS — R09.89 RUNNY NOSE: ICD-10-CM

## 2024-09-25 DIAGNOSIS — R11.2 NAUSEA AND VOMITING, UNSPECIFIED VOMITING TYPE: ICD-10-CM

## 2024-09-25 LAB
AMORPH CRY UR QL COMP ASSIST: NORMAL
BACTERIA #/AREA URNS AUTO: NORMAL /HPF
BILIRUB UR QL STRIP: NEGATIVE
BILIRUBIN, UA POC OHS: ABNORMAL
BLOOD, UA POC OHS: ABNORMAL
CLARITY UR REFRACT.AUTO: ABNORMAL
CLARITY, UA POC OHS: CLEAR
COLOR UR AUTO: ABNORMAL
COLOR, UA POC OHS: ABNORMAL
CTP QC/QA: YES
CTP QC/QA: YES
GLUCOSE UR QL STRIP: NEGATIVE
GLUCOSE, UA POC OHS: NEGATIVE
HGB UR QL STRIP: ABNORMAL
KETONES UR QL STRIP: ABNORMAL
KETONES, UA POC OHS: 40
LEUKOCYTE ESTERASE UR QL STRIP: ABNORMAL
LEUKOCYTES, UA POC OHS: ABNORMAL
MICROSCOPIC COMMENT: NORMAL
NITRITE UR QL STRIP: NEGATIVE
NITRITE, UA POC OHS: NEGATIVE
PH UR STRIP: 6 [PH] (ref 5–8)
PH, UA POC OHS: 6.5
POC MOLECULAR INFLUENZA A AGN: NEGATIVE
POC MOLECULAR INFLUENZA B AGN: NEGATIVE
PROT UR QL STRIP: ABNORMAL
PROTEIN, UA POC OHS: 30
RBC #/AREA URNS AUTO: 2 /HPF (ref 0–4)
SARS-COV-2 RDRP RESP QL NAA+PROBE: NEGATIVE
SP GR UR STRIP: 1.02 (ref 1–1.03)
SPECIFIC GRAVITY, UA POC OHS: 1.02
SQUAMOUS #/AREA URNS AUTO: 0 /HPF
URN SPEC COLLECT METH UR: ABNORMAL
UROBILINOGEN, UA POC OHS: 0.2
WBC #/AREA URNS AUTO: 5 /HPF (ref 0–5)

## 2024-09-25 PROCEDURE — 3074F SYST BP LT 130 MM HG: CPT | Mod: CPTII,S$GLB,, | Performed by: FAMILY MEDICINE

## 2024-09-25 PROCEDURE — 3079F DIAST BP 80-89 MM HG: CPT | Mod: CPTII,S$GLB,, | Performed by: FAMILY MEDICINE

## 2024-09-25 PROCEDURE — 81003 URINALYSIS AUTO W/O SCOPE: CPT | Mod: QW,S$GLB,, | Performed by: FAMILY MEDICINE

## 2024-09-25 PROCEDURE — 99214 OFFICE O/P EST MOD 30 MIN: CPT | Mod: S$GLB,,, | Performed by: FAMILY MEDICINE

## 2024-09-25 PROCEDURE — 87635 SARS-COV-2 COVID-19 AMP PRB: CPT | Mod: QW,S$GLB,, | Performed by: FAMILY MEDICINE

## 2024-09-25 PROCEDURE — 87086 URINE CULTURE/COLONY COUNT: CPT | Performed by: FAMILY MEDICINE

## 2024-09-25 PROCEDURE — 81001 URINALYSIS AUTO W/SCOPE: CPT | Performed by: FAMILY MEDICINE

## 2024-09-25 PROCEDURE — 99999 PR PBB SHADOW E&M-EST. PATIENT-LVL IV: CPT | Mod: PBBFAC,,, | Performed by: FAMILY MEDICINE

## 2024-09-25 PROCEDURE — 3008F BODY MASS INDEX DOCD: CPT | Mod: CPTII,S$GLB,, | Performed by: FAMILY MEDICINE

## 2024-09-25 PROCEDURE — 87502 INFLUENZA DNA AMP PROBE: CPT | Mod: QW,S$GLB,, | Performed by: FAMILY MEDICINE

## 2024-09-25 RX ORDER — ONDANSETRON 8 MG/1
8 TABLET, ORALLY DISINTEGRATING ORAL EVERY 8 HOURS PRN
Qty: 12 TABLET | Refills: 0 | Status: SHIPPED | OUTPATIENT
Start: 2024-09-25

## 2024-09-25 NOTE — PROGRESS NOTES
"Subjective:       Patient ID: April Myranda Moser is a 59 y.o. female.    Chief Complaint: Low-back Pain (Pain 8/10) and Emesis (Pain 8/10)    HPI 60 y/o female with Insomnia, IBS-C (Dr. Madsen), HTN, HLPD, CRYSTAL, Osteopenia, OA, Migraines, hx of recurrent UTI (better since starting estradiol topically) is here with back pain.     Started feeling bad at 5 am, she has been having nausea, belching, one episode of vomiting liquid "bile", abdominal pain, pain is sharp epigastric and has been constant 8/10, she also has lower back pain is achy and spreads to the front, 8/10, she tried heat, position changes that did not help, has not had any medications for it. She denies f/body aches/d, she has baseline constipation and stools once every 3 days, she is taking ducolax that helps some, last bm was Sunday 3 days ago, she denies cp/sob/urinary sx. She has had sinus sx for 3 days, she has runny nose, frontal sinus pressure, throat clearing.  Appetite decreased. No recent travel, no sick constants, no recent antibiotic use.       Review of Systems    Objective:      /82 (BP Location: Left arm, Patient Position: Sitting, BP Method: Small (Manual))   Pulse 83   Ht 5' 6" (1.676 m)   Wt 64.8 kg (142 lb 13.7 oz)   SpO2 (!) 94%   BMI 23.06 kg/m²   Physical Exam  Vitals and nursing note reviewed.   Constitutional:       Appearance: She is well-developed.   HENT:      Head: Normocephalic and atraumatic.      Mouth/Throat:      Pharynx: No oropharyngeal exudate or posterior oropharyngeal erythema.   Neck:      Thyroid: No thyromegaly.   Cardiovascular:      Rate and Rhythm: Normal rate and regular rhythm.      Heart sounds: Normal heart sounds.   Pulmonary:      Effort: Pulmonary effort is normal. No respiratory distress.      Breath sounds: Normal breath sounds.   Abdominal:      General: Bowel sounds are normal. There is no distension.      Palpations: Abdomen is soft. There is no mass.      Tenderness: There is abdominal " tenderness (diffuse).   Musculoskeletal:      Cervical back: Normal range of motion and neck supple.      Right lower leg: No edema.      Left lower leg: No edema.   Lymphadenopathy:      Cervical: No cervical adenopathy.   Skin:     General: Skin is warm and dry.   Neurological:      Mental Status: She is alert.         Assessment:       1. Bilious vomiting with nausea    2. Constipation, unspecified constipation type    3. Abdominal pain, unspecified abdominal location    4. Runny nose    5. Sinus pressure    6. Nausea and vomiting, unspecified vomiting type    7. Diverticulosis    8. Essential hypertension        Plan:   April was seen today for low-back pain and emesis.    Diagnoses and all orders for this visit:    Bilious vomiting with nausea  -     POCT Urinalysis(Instrument)  -     CBC Auto Differential; Future  -     Comprehensive Metabolic Panel; Future  -     Urine culture; Future  -     Urinalysis; Future  -     CT Abdomen Pelvis  Without Contrast; Future  -     POCT COVID-19 Rapid Screening; Future  -     POCT Influenza A/B Molecular  -     POCT COVID-19 Rapid Screening    Constipation, unspecified constipation type  -     POCT Urinalysis(Instrument)  -     CBC Auto Differential; Future  -     Comprehensive Metabolic Panel; Future  -     Urine culture; Future  -     Urinalysis; Future  -     CT Abdomen Pelvis  Without Contrast; Future  -     POCT COVID-19 Rapid Screening; Future  -     POCT Influenza A/B Molecular  -     POCT COVID-19 Rapid Screening    Abdominal pain, unspecified abdominal location  -     POCT Urinalysis(Instrument)  -     CBC Auto Differential; Future  -     Comprehensive Metabolic Panel; Future  -     Urine culture; Future  -     Urinalysis; Future  -     CT Abdomen Pelvis  Without Contrast; Future  -     POCT COVID-19 Rapid Screening; Future  -     POCT Influenza A/B Molecular  -     ondansetron (ZOFRAN-ODT) 8 MG TbDL; Take 1 tablet (8 mg total) by mouth every 8 (eight) hours as  needed (nause or vomiting).  -     POCT COVID-19 Rapid Screening    Runny nose  -     POCT COVID-19 Rapid Screening; Future  -     POCT Influenza A/B Molecular  -     POCT COVID-19 Rapid Screening    Sinus pressure  -     POCT COVID-19 Rapid Screening; Future  -     POCT Influenza A/B Molecular  -     POCT COVID-19 Rapid Screening    Nausea and vomiting, unspecified vomiting type  -     ondansetron (ZOFRAN-ODT) 8 MG TbDL; Take 1 tablet (8 mg total) by mouth every 8 (eight) hours as needed (nause or vomiting).  -     POCT COVID-19 Rapid Screening    Diverticulosis  -     CBC Auto Differential; Future  -     Comprehensive Metabolic Panel; Future  -     CT Abdomen Pelvis  Without Contrast; Future    Essential hypertension

## 2024-09-25 NOTE — PROGRESS NOTES
Health Maintenance Due   Topic Date Due    Influenza Vaccine (1) 09/01/2024    COVID-19 Vaccine (4 - 2023-24 season) 09/01/2024     Chart review completed. HM Updated. Triggered Links. Immunizations reviewed and updated. Care Everywhere Updated. Care Team Updated.  Imported outside mammogram results from [facility] into HM/media.

## 2024-09-26 ENCOUNTER — PATIENT MESSAGE (OUTPATIENT)
Dept: PRIMARY CARE CLINIC | Facility: CLINIC | Age: 59
End: 2024-09-26
Payer: COMMERCIAL

## 2024-09-26 ENCOUNTER — CLINICAL SUPPORT (OUTPATIENT)
Dept: PRIMARY CARE CLINIC | Facility: CLINIC | Age: 59
End: 2024-09-26
Payer: COMMERCIAL

## 2024-09-26 ENCOUNTER — HOSPITAL ENCOUNTER (OUTPATIENT)
Dept: RADIOLOGY | Facility: HOSPITAL | Age: 59
Discharge: HOME | End: 2024-09-26
Attending: FAMILY MEDICINE
Payer: COMMERCIAL

## 2024-09-26 DIAGNOSIS — R09.89 RUNNY NOSE: ICD-10-CM

## 2024-09-26 DIAGNOSIS — R10.9 ABDOMINAL PAIN, UNSPECIFIED ABDOMINAL LOCATION: Primary | ICD-10-CM

## 2024-09-26 DIAGNOSIS — K59.00 CONSTIPATION, UNSPECIFIED CONSTIPATION TYPE: ICD-10-CM

## 2024-09-26 DIAGNOSIS — R10.9 ABDOMINAL PAIN, UNSPECIFIED ABDOMINAL LOCATION: ICD-10-CM

## 2024-09-26 DIAGNOSIS — J34.89 SINUS PRESSURE: ICD-10-CM

## 2024-09-26 DIAGNOSIS — R11.14 BILIOUS VOMITING WITH NAUSEA: ICD-10-CM

## 2024-09-26 LAB — BACTERIA UR CULT: NORMAL

## 2024-09-26 PROCEDURE — 99999 PR PBB SHADOW E&M-EST. PATIENT-LVL I: CPT | Mod: PBBFAC,,,

## 2024-09-26 PROCEDURE — 74176 CT ABD & PELVIS W/O CONTRAST: CPT | Mod: 26,,, | Performed by: STUDENT IN AN ORGANIZED HEALTH CARE EDUCATION/TRAINING PROGRAM

## 2024-09-26 PROCEDURE — 74176 CT ABD & PELVIS W/O CONTRAST: CPT | Mod: TC

## 2024-09-26 PROCEDURE — 96372 THER/PROPH/DIAG INJ SC/IM: CPT | Mod: S$GLB,,, | Performed by: FAMILY MEDICINE

## 2024-09-26 RX ORDER — KETOROLAC TROMETHAMINE 30 MG/ML
30 INJECTION, SOLUTION INTRAMUSCULAR; INTRAVENOUS
Status: COMPLETED | OUTPATIENT
Start: 2024-09-26 | End: 2024-09-26

## 2024-09-26 RX ADMIN — KETOROLAC TROMETHAMINE 30 MG: 30 INJECTION, SOLUTION INTRAMUSCULAR; INTRAVENOUS at 10:09

## 2024-09-26 NOTE — PROGRESS NOTES
Pt identified with name and , allergies reviewed,gave consent and was given Toradol shot per MD order. Tolerated well. Instructed to wait around for 15 minutes post administration.

## 2024-09-26 NOTE — TELEPHONE ENCOUNTER
Called and discussed labs with patient, she is feeling a little better, has CT planned for today, will come in for Toradol shot this morning.

## 2024-09-29 DIAGNOSIS — F41.9 ANXIETY: ICD-10-CM

## 2024-09-29 NOTE — TELEPHONE ENCOUNTER
No care due was identified.  [facility] Embedded Care Due Messages. Reference number: [id number].   9/29/2024 12:42:46 PM CDT

## 2024-09-30 RX ORDER — CITALOPRAM 10 MG/1
10 TABLET ORAL DAILY
Qty: 90 TABLET | Refills: 1 | Status: SHIPPED | OUTPATIENT
Start: 2024-09-30 | End: 2025-09-30

## 2024-11-11 ENCOUNTER — OFFICE VISIT (OUTPATIENT)
Dept: OBSTETRICS AND GYNECOLOGY | Facility: CLINIC | Age: 59
End: 2024-11-11
Payer: COMMERCIAL

## 2024-11-11 VITALS
HEIGHT: 66 IN | BODY MASS INDEX: 22.85 KG/M2 | WEIGHT: 142.19 LBS | DIASTOLIC BLOOD PRESSURE: 70 MMHG | SYSTOLIC BLOOD PRESSURE: 120 MMHG

## 2024-11-11 DIAGNOSIS — Z01.419 ENCOUNTER FOR GYNECOLOGICAL EXAMINATION: Primary | ICD-10-CM

## 2024-11-11 DIAGNOSIS — Z11.51 ENCOUNTER FOR SCREENING FOR HUMAN PAPILLOMAVIRUS (HPV): ICD-10-CM

## 2024-11-11 DIAGNOSIS — Z12.4 ENCOUNTER FOR SCREENING FOR CERVICAL CANCER: ICD-10-CM

## 2024-11-11 DIAGNOSIS — Z00.00 ANNUAL PHYSICAL EXAM: ICD-10-CM

## 2024-11-11 PROCEDURE — 88175 CYTOPATH C/V AUTO FLUID REDO: CPT | Performed by: STUDENT IN AN ORGANIZED HEALTH CARE EDUCATION/TRAINING PROGRAM

## 2024-11-11 PROCEDURE — 87624 HPV HI-RISK TYP POOLED RSLT: CPT | Performed by: STUDENT IN AN ORGANIZED HEALTH CARE EDUCATION/TRAINING PROGRAM

## 2024-11-11 PROCEDURE — 99999 PR PBB SHADOW E&M-EST. PATIENT-LVL III: CPT | Mod: PBBFAC,,, | Performed by: STUDENT IN AN ORGANIZED HEALTH CARE EDUCATION/TRAINING PROGRAM

## 2024-11-11 NOTE — PROGRESS NOTES
Chief Complaint: Well Woman Exam     HPI:      April Myranda Moser is a 59 y.o.  who presents for annual exam as new GYN patient.  Postmenopausal since 50 years old. Never on HRT. Using vaginal estrogen from Urologist for UTIs, which she has been doing well with. Had pyelo in 2023 and no UTI since that time.  Today patient's GYN complaints include: none.  Specifically, patient denies vaginal bleeding, discharge, pelvic pain, urinary problems, vaginal itching or irritation.  Ms. Moser is not currently sexually active.     Previous Pap: NILM, HPV negative ( in care everywhere)  Previous Mammogram: BiRads: 2 T-C Score: 6.5 (2024)   Most Recent Dexa: Osteopenia (2024), Repeat in   Most Recent Colonoscopy: WNL (2017), Repeat in     Past Medical History:   Diagnosis Date    Hyperlipidemia     Hypertension        Past Surgical History:   Procedure Laterality Date    colonosocpy  2017    Diverticulosis in the sigmoid colon Repeat in 10 years       Social History     Socioeconomic History    Marital status:    Tobacco Use    Smoking status: Never     Passive exposure: Never    Smokeless tobacco: Never   Substance and Sexual Activity    Alcohol use: Yes     Alcohol/week: 1.0 standard drink of alcohol     Types: 1 Glasses of wine per week     Comment: Socially    Drug use: Never    Sexual activity: Not Currently     Partners: Male     Birth control/protection: Post-menopausal     Family History   Problem Relation Name Age of Onset    No Known Problems Mother      Aneurysm Father      Thyroid disease Sister      No Known Problems Brother         Review of patient's allergies indicates:   Allergen Reactions    Cefdinir Diarrhea and Nausea And Vomiting       OB History          2    Para   2    Term   2            AB        Living             SAB        IAB        Ectopic        Multiple        Live Births                     Physical Exam:      PHYSICAL EXAM:  /70 (BP  "Location: Left arm, Patient Position: Sitting)   Ht 5' 6" (1.676 m)   Wt 64.5 kg (142 lb 3.2 oz)   LMP  (LMP Unknown)   BMI 22.95 kg/m²   Body mass index is 22.95 kg/m².     APPEARANCE: Well nourished, well developed, in no acute distress.  PSYCH: Appropriate mood and affect.  SKIN: No acne or hirsutism  NECK: Neck symmetric without masses or thyromegaly  NODES: No inguinal, axillary, or supraclavicular lymph node enlargement  CHEST: Normal respiratory effort.  ABDOMEN: Soft.  No tenderness or masses.   BREASTS: Symmetrical, no visible skin lesions. No palpable masses. No nipple discharge bilaterally.  PELVIC: Normal external genitalia without lesions.  Normal hair distribution.  Adequate perineal body, normal urethral meatus.  Vagina  atrophic and smooth . Without lesions. Without discharge.  Cervix pink, without lesions, discharge or tenderness.  No significant cystocele or rectocele.  Bimanual exam shows uterus to be normal size, regular, mobile and nontender.  Adnexa without masses or tenderness.      Assessment/Plan:     Encounter for gynecological examination    Annual physical exam  -     Ambulatory referral/consult to Obstetrics / Gynecology    Encounter for screening for human papillomavirus (HPV)  -     HPV High Risk Genotypes, PCR    Encounter for screening for cervical cancer  -     Liquid-Based Pap Smear, Screening      - pelvic exam normal  - pap/hpv collected today  - MMG, DXA, colonoscopy all up to date with PCP  - continue weekly vaginal estrace cream per Urology   - RTC 1 yr or sooner prn      Counseling:     Patient was counseled today on current ASCCP pap guidelines, the recommendation for yearly physical exams, safe driving habits, breast self awareness and annual mammograms. She is to see her PCP for other health maintenance.       Use of the [facility] Patient Portal discussed and encouraged during today's visit.     "

## 2024-11-15 LAB
FINAL PATHOLOGIC DIAGNOSIS: NORMAL
Lab: NORMAL

## 2024-11-19 ENCOUNTER — PATIENT MESSAGE (OUTPATIENT)
Dept: OBSTETRICS AND GYNECOLOGY | Facility: CLINIC | Age: 59
End: 2024-11-19
Payer: COMMERCIAL

## 2024-12-17 ENCOUNTER — OFFICE VISIT (OUTPATIENT)
Dept: PRIMARY CARE CLINIC | Facility: CLINIC | Age: 59
End: 2024-12-17
Payer: COMMERCIAL

## 2024-12-17 VITALS
BODY MASS INDEX: 23.41 KG/M2 | SYSTOLIC BLOOD PRESSURE: 118 MMHG | WEIGHT: 145.06 LBS | OXYGEN SATURATION: 95 % | HEART RATE: 76 BPM | DIASTOLIC BLOOD PRESSURE: 60 MMHG

## 2024-12-17 DIAGNOSIS — L03.019 PARONYCHIA OF FINGER, UNSPECIFIED LATERALITY: Primary | ICD-10-CM

## 2024-12-17 PROCEDURE — 3008F BODY MASS INDEX DOCD: CPT | Mod: CPTII,S$GLB,, | Performed by: NURSE PRACTITIONER

## 2024-12-17 PROCEDURE — 99214 OFFICE O/P EST MOD 30 MIN: CPT | Mod: S$GLB,,, | Performed by: NURSE PRACTITIONER

## 2024-12-17 PROCEDURE — 3078F DIAST BP <80 MM HG: CPT | Mod: CPTII,S$GLB,, | Performed by: NURSE PRACTITIONER

## 2024-12-17 PROCEDURE — 99999 PR PBB SHADOW E&M-EST. PATIENT-LVL III: CPT | Mod: PBBFAC,,, | Performed by: NURSE PRACTITIONER

## 2024-12-17 PROCEDURE — 3074F SYST BP LT 130 MM HG: CPT | Mod: CPTII,S$GLB,, | Performed by: NURSE PRACTITIONER

## 2024-12-17 PROCEDURE — 1159F MED LIST DOCD IN RCRD: CPT | Mod: CPTII,S$GLB,, | Performed by: NURSE PRACTITIONER

## 2024-12-17 RX ORDER — DOXYCYCLINE HYCLATE 100 MG
100 TABLET ORAL EVERY 12 HOURS
Qty: 14 TABLET | Refills: 0 | Status: SHIPPED | OUTPATIENT
Start: 2024-12-17 | End: 2024-12-24

## 2024-12-17 NOTE — PROGRESS NOTES
[facility] Note    Chief Complaint      Chief Complaint   Patient presents with    Laceration       History of Present Illness      April Myranda Moser is a 59 y.o. female with chronic conditions of hld, htn who presents today for: injury to index left, middle right, not sure if cut cuticles too short, and not sure if cat bit her. Has been over a week. No fever or chills. No drainage. No swelling. Has been placing mupiorcin on it, without help. Does have slight pain. Has been soaking in warm water.     Answers submitted by the patient for this visit:  Review of Systems Questionnaire (Submitted on 12/16/2024)  activity change: No  unexpected weight change: No  rhinorrhea: No  trouble swallowing: No  visual disturbance: No  chest tightness: No  polyuria: No  difficulty urinating: No  menstrual problem: No  joint swelling: No  arthralgias: No  confusion: No  dysphoric mood: No      Past Medical History:  Past Medical History:   Diagnosis Date    Hyperlipidemia     Hypertension        Past Surgical History:   has a past surgical history that includes colonosocpy (04/18/2017).    Family History:  family history includes Aneurysm in her father; No Known Problems in her brother and mother; Thyroid disease in her sister.     Social History:  Social History     Tobacco Use    Smoking status: Never     Passive exposure: Never    Smokeless tobacco: Never   Substance Use Topics    Alcohol use: Yes     Alcohol/week: 1.0 standard drink of alcohol     Types: 1 Glasses of wine per week     Comment: Socially    Drug use: Never       Review of Systems   HENT:  Negative for hearing loss.    Eyes:  Negative for discharge.   Respiratory:  Negative for wheezing.    Cardiovascular:  Negative for chest pain and palpitations.   Gastrointestinal:  Negative for blood in stool, constipation, diarrhea and vomiting.   Genitourinary:  Negative for dysuria and hematuria.   Musculoskeletal:  Negative for neck pain.   Neurological:   Negative for weakness and headaches.   Endo/Heme/Allergies:  Negative for polydipsia.        Medications:  Outpatient Encounter Medications as of 2024   Medication Sig Dispense Refill    amLODIPine (NORVASC) 5 MG tablet Take 1 tablet (5 mg total) by mouth once daily. 90 tablet 3    citalopram (CELEXA) 10 MG tablet Take 1 tablet (10 mg total) by mouth once daily. 90 tablet 1    [] doxycycline (VIBRA-TABS) 100 MG tablet Take 1 tablet (100 mg total) by mouth every 12 (twelve) hours. for 7 days 14 tablet 0    ergocalciferol (ERGOCALCIFEROL) 50,000 unit Cap Take 1 capsule (50,000 Units total) by mouth every 30 days. 12 capsule 1    estradioL (ESTRACE) 0.01 % (0.1 mg/gram) vaginal cream PLACE PEA SIZED AMOUNT IN VAGINA (1G) DAILY X2 WEEKS THEN MW      FLUCELVAX QUAD [id number], PF, 60 mcg (15 mcg x 4)/0.5 mL Syrg TO BE ADMINISTERED BY PHARMACIST FOR IMMUNIZATION (Patient not taking: Reported on 2024)  0    fluticasone propionate (FLONASE) 50 mcg/actuation nasal spray 1 spray (50 mcg total) by Each Nostril route once daily. (Patient not taking: Reported on 2024) 9.9 mL 0    ondansetron (ZOFRAN-ODT) 8 MG TbDL Take 1 tablet (8 mg total) by mouth every 8 (eight) hours as needed (nause or vomiting). 12 tablet 0    pravastatin (PRAVACHOL) 20 MG tablet Take 1 tablet (20 mg total) by mouth once daily. 90 tablet 3    traZODone (DESYREL) 50 MG tablet        No facility-administered encounter medications on file as of 2024.       Allergies:  Review of patient's allergies indicates:   Allergen Reactions    Cefdinir Diarrhea and Nausea And Vomiting       Health Maintenance:  Immunization History   Administered Date(s) Administered    COVID-19, MRNA, LN-S, PF (Pfizer) (Purple Cap) 2021, 2021    COVID-19, mRNA, LNP-S, bivalent booster, PF (PFIZER OMICRON) 2022    Influenza 2014, 2015, 10/27/2016, 10/23/2019, 2020, 2022    Influenza - Quadrivalent - MDCK - PF  10/23/2019    Influenza - Quadrivalent - PF *Preferred* (6 months and older) 12/18/2014, 12/07/2015, 10/27/2016, 10/30/2017, 10/28/2018, 09/26/2020, 10/28/2021, 12/08/2022    Influenza - Trivalent - Fluarix, Flulaval, Fluzone, Afluria - PF 12/18/2014, 12/07/2015, 10/27/2016    Td (ADULT) 05/25/2018    Tdap 04/29/2021    Zoster Recombinant 08/03/2024      Health Maintenance   Topic Date Due    Pneumococcal Vaccines (Age 50+) (1 of 1 - PCV) Never done    COVID-19 Vaccine (4 - 2024-25 season) 09/01/2024    Shingles Vaccine (2 of 2) 09/28/2024    Mammogram  09/23/2025    DEXA Scan  01/26/2026    Colorectal Cancer Screening  04/18/2027    Lipid Panel  03/08/2029    Cervical Cancer Screening  11/11/2029    TETANUS VACCINE  04/29/2031    RSV Vaccine (Age 60+ and Pregnant patients) (1 - 1-dose 75+ series) 04/16/2042    Hepatitis C Screening  Completed    Influenza Vaccine  Completed    HIV Screening  Completed        Physical Exam      Vital Signs  Pulse: 76  SpO2: 95 %  BP: 118/60  BP Location: Left arm  Height and Weight  Weight: 65.8 kg (145 lb 1 oz)]    Physical Exam  Constitutional:       Appearance: She is well-developed.   HENT:      Head: Normocephalic and atraumatic.   Cardiovascular:      Rate and Rhythm: Normal rate and regular rhythm.      Heart sounds: Normal heart sounds. No murmur heard.  Pulmonary:      Effort: Pulmonary effort is normal. No respiratory distress.      Breath sounds: Normal breath sounds.   Abdominal:      General: There is no distension.      Palpations: Abdomen is soft.      Tenderness: There is no abdominal tenderness. There is no guarding.   Musculoskeletal:         General: Normal range of motion.   Skin:     General: Skin is warm and dry.      Comments: Healing scab noted to right middle finger,  cuticle bed with slight erythema. No purulent drainage.    Neurological:      Mental Status: She is alert. Mental status is at baseline.   Psychiatric:         Behavior: Behavior normal.           Laboratory:  CBC:  Recent Labs   Lab 06/25/22  0914 07/07/23  0640 09/25/24  1443   WBC 8.3 9.5 8.53   RBC 4.84 4.88 4.92   Hemoglobin 13.9 14.3 14.8   Hematocrit 42.4 43.0 44.2   Platelets 325 356 292   MCV 87.6 88.1 90   MCH 28.7 29.3 30.1   MCHC 32.8 33.3 33.5     CMP:  Recent Labs   Lab 06/25/22  0914 07/07/23  0640 01/12/24  1118 09/25/24  1443   Glucose 93 93  --  118 H   Calcium 9.8 9.5   < > 9.7   Albumin 4.7 4.5   < > 4.1   Total Protein 7.3 6.9  --  7.6   Sodium 141 139  --  136   Potassium 4.5 4.0  --  4.5   CO2 31 30  --  24   Chloride 103 102  --  103   BUN 15 16  --  10   Alkaline Phosphatase  --   --   --  92   ALT 12 14  --  12   AST 16 15  --  22   Total Bilirubin 1.7 H 1.5 H  --  1.5 H    < > = values in this interval not displayed.     URINALYSIS:  Recent Labs   Lab 09/25/24  1448 09/25/24  1511   Color, POC UA Sheryl A  --    Color, UA  --  Orange A   Clarity, POC UA Clear  --    Spec Grav POC UA 1.025  --    Specific Gravity, UA  --  1.025   pH, POC UA 6.5  --    pH, UA  --  6.0   Protein, UA  --  Trace A   Bacteria  --  Occasional   Nitrite, POC UA Negative  --    Nitrite, UA  --  Negative   Leukocytes, UA  --  2+ A   Urobilinogen, POC UA 0.2  --       LIPIDS:  Recent Labs   Lab 06/25/22  0914 07/07/23  0640 03/08/24  0000   TSH 1.46 1.56  --    HDL 69 76 78 A   Cholesterol 195 199 194   Triglycerides 118 142 104   LDL Cholesterol 105 H 99  --    LDL Calculated  --   --  96   HDL/Cholesterol Ratio 2.8 2.6  --    Non HDL Chol. (LDL+VLDL) 126 123  --      TSH:  Recent Labs   Lab 06/25/22  0914 07/07/23  0640   TSH 1.46 1.56     A1C:        Assessment/Plan     April Myranda Moser is a 59 y.o.female with:    1. Paronychia of finger, unspecified laterality   Instructed to soak in warm water 4x day  Apply mupirocin  If no improvement, take Doxycycline as prescribed  RTC if no improvement.     Chronic conditions status updated as per HPI.  Other than changes above, cont current medications and  maintain follow up with specialists.  No follow-ups on file.    No future appointments.    MARY Valenzuela  [facility]

## 2025-03-09 ENCOUNTER — PATIENT MESSAGE (OUTPATIENT)
Dept: PRIMARY CARE CLINIC | Facility: CLINIC | Age: 60
End: 2025-03-09
Payer: COMMERCIAL

## 2025-03-10 NOTE — TELEPHONE ENCOUNTER
LOV with Clarke Almendarez MD , 7/12/2024    Please see patient message with attached outside labs

## 2025-03-11 NOTE — TELEPHONE ENCOUNTER
Cholesterol is higher than last check in 2023.  Recommend low fat diet and exercise to better control.

## 2025-04-06 DIAGNOSIS — F41.9 ANXIETY: ICD-10-CM

## 2025-04-07 RX ORDER — CITALOPRAM 10 MG/1
10 TABLET ORAL DAILY
Qty: 90 TABLET | Refills: 1 | Status: SHIPPED | OUTPATIENT
Start: 2025-04-07 | End: 2026-04-07

## 2025-07-16 ENCOUNTER — OFFICE VISIT (OUTPATIENT)
Dept: PRIMARY CARE CLINIC | Facility: CLINIC | Age: 60
End: 2025-07-16
Payer: COMMERCIAL

## 2025-07-16 VITALS
OXYGEN SATURATION: 97 % | BODY MASS INDEX: 23.03 KG/M2 | HEART RATE: 70 BPM | DIASTOLIC BLOOD PRESSURE: 64 MMHG | SYSTOLIC BLOOD PRESSURE: 110 MMHG | HEIGHT: 66 IN | WEIGHT: 143.31 LBS

## 2025-07-16 DIAGNOSIS — M17.0 PRIMARY OSTEOARTHRITIS OF BOTH KNEES: ICD-10-CM

## 2025-07-16 DIAGNOSIS — F51.01 PRIMARY INSOMNIA: ICD-10-CM

## 2025-07-16 DIAGNOSIS — I10 ESSENTIAL HYPERTENSION: ICD-10-CM

## 2025-07-16 DIAGNOSIS — Z12.31 SCREENING MAMMOGRAM, ENCOUNTER FOR: ICD-10-CM

## 2025-07-16 DIAGNOSIS — M85.80 OSTEOPENIA, UNSPECIFIED LOCATION: ICD-10-CM

## 2025-07-16 DIAGNOSIS — E78.2 HYPERLIPIDEMIA, MIXED: ICD-10-CM

## 2025-07-16 DIAGNOSIS — Z00.00 ANNUAL PHYSICAL EXAM: Primary | ICD-10-CM

## 2025-07-16 DIAGNOSIS — K21.9 GERD WITHOUT ESOPHAGITIS: ICD-10-CM

## 2025-07-16 DIAGNOSIS — F41.9 ANXIETY: ICD-10-CM

## 2025-07-16 DIAGNOSIS — G43.909 MIGRAINE WITHOUT STATUS MIGRAINOSUS, NOT INTRACTABLE, UNSPECIFIED MIGRAINE TYPE: ICD-10-CM

## 2025-07-16 PROCEDURE — 1159F MED LIST DOCD IN RCRD: CPT | Mod: CPTII,S$GLB,, | Performed by: INTERNAL MEDICINE

## 2025-07-16 PROCEDURE — 99999 PR PBB SHADOW E&M-EST. PATIENT-LVL IV: CPT | Mod: PBBFAC,,, | Performed by: INTERNAL MEDICINE

## 2025-07-16 PROCEDURE — 3008F BODY MASS INDEX DOCD: CPT | Mod: CPTII,S$GLB,, | Performed by: INTERNAL MEDICINE

## 2025-07-16 PROCEDURE — 3078F DIAST BP <80 MM HG: CPT | Mod: CPTII,S$GLB,, | Performed by: INTERNAL MEDICINE

## 2025-07-16 PROCEDURE — 99396 PREV VISIT EST AGE 40-64: CPT | Mod: S$GLB,,, | Performed by: INTERNAL MEDICINE

## 2025-07-16 PROCEDURE — 3074F SYST BP LT 130 MM HG: CPT | Mod: CPTII,S$GLB,, | Performed by: INTERNAL MEDICINE

## 2025-07-16 RX ORDER — PANTOPRAZOLE SODIUM 40 MG/1
40 TABLET, DELAYED RELEASE ORAL DAILY
Qty: 30 TABLET | Refills: 3 | Status: SHIPPED | OUTPATIENT
Start: 2025-07-16 | End: 2026-07-16

## 2025-07-16 NOTE — PROGRESS NOTES
"[facility] Note    Chief Complaint      Chief Complaint   Patient presents with    Annual Exam       History of Present Illness      History of Present Illness    CHIEF COMPLAINT:  Ms. Moser presents today with acid reflux symptoms    GERD:  She reports persistent acid reflux symptoms occurring during floor exercises and waking her up at night with a sensation of impending emesis. She describes experiencing reflux that "comes up" during these activities. She has previously attempted OTC treatments including Pepcid, Tums, and Pepto-Bismol with minimal relief. She denies prior gastroenterology evaluation and has never tried Prilosec or Nexium. She expresses interest in exploring additional management strategies.    CURRENT MEDICATIONS:  She continues amlodipine and pravastatin, maintaining LDL cholesterol at 96. She continues Celexa at current dose and reports concerns about increasing dosage due to potential daytime sleepiness. She takes trazodone for sleep and notes sensitivity to timing of medication, experiencing significant drowsiness if taken too late at night.    MEDICAL HISTORY:  She has a history of knee arthritis, currently not causing significant symptoms or limitations. She also has a history of migraines, currently stable and well-controlled.    DIET AND EXERCISE:  She climbs stairs up to the 15th floor at work and walks back down. She walks around the block multiple times, completing approximately four corner circuits. She performs 30 minutes of weight training daily. She primarily cooks at home, avoiding eating out due to expense. She monitors dietary intake by avoiding fried foods and being mindful of fatty foods and carbohydrates. She drinks significant amounts of water throughout the day.    PREVENTIVE CARE:  She received flu vaccine last season. Tetanus shot was administered in 2021 and remains current. She has completed shingles vaccine series. She continues annual laboratory " studies with results forwarded to provider for review. Mammogram to be ordered.      ROS:  Gastrointestinal: +indigestion  Genitourinary: +excessive urination  Musculoskeletal: +joint pain  Neurological: +excessive drowsiness  Psychiatric: +anxiety       HTN: BP at goal on amlodipine.   HLD: Controlled on pravastatin.  LDL 96 last check.  Anxiety: Doing well on celexa. No new or worsening symptoms.    Primary insomnia: Controlled on trazodone.  Osteoarthritis, knees: Doing well on meloxicam as needed.  Had steroid injections with Dr. Heaton.   Migraines: Doing well.  No recent exacerbation     Diet: Prepares own food mostly.  Limiting fatty foods and carbs.  Drinks plenty water.   Exercise: Walks stairs and halls during day.  Treadmill 30 min daily, weights 30 minutes daily.    Denies drinking and driving, drinking more than 4 drinks on occasion, drug use.     Flu shot UTD.  TdAP 2021.  Shingrix UTD.  Pneumonia vaccine due age 67. COVID UTD.  Mammogram UTD 2024.  PAP smear UTD 2024, Dr. Clements.  DEXA 2024, osteopenia.  Cscope 2017 with Dr. Enriquez, no polyps, 10 yr interval.     Assessment/Plan     April Myranda Moser is a 60 y.o.female with:    Assessment & Plan    PLAN SUMMARY:  - Started Pantoprazole (Protonix) for reflux symptoms  - Continue amlodipine and pravastatin  - Continue Celexa at current dosage  - Referred to Dr. Edouard Madsen for EGD  - Ordered mammogram at [facility]  - Ms. Moser to maintain current diet and exercise routine  - Ms. Moser to send lab results for review when completed at work  - Ms. Moser to contact office when mammogram is scheduled in September    GASTROESOPHAGEAL REFLUX DISEASE (GERD):  - Ms. Moser experiences reflux symptoms, including waking up at night feeling like they will vomit.  - Started Pantoprazole (Protonix), to be taken first thing in the morning 30-45 minutes before eating or drinking.  - Advised that improvement may take a few days.  - Discussed short-term PPI therapy,  balancing benefits against potential long-term risks (reduced magnesium levels, increased osteoporosis risk).  - Referred to Dr. Edouard Madsen for EGD to investigate persistent symptoms and rule out structural causes like hiatal hernia or H. pylori.    HYPERTENSION:  - Blood pressure is well-controlled at 110/64.  - Continue amlodipine.    HYPERLIPIDEMIA:  - LDL cholesterol is in good range at 96.  - Continue pravastatin.    ANXIETY DISORDER:  - Ms. Moser reports stress due to financial responsibilities and son's job loss.  - Will continue Celexa at current dosage as patient declines dose increase due to concerns about excessive daytime drowsiness.    INSOMNIA:  - Ms. Moser experiences difficulty waking up if Trazodone is taken too late.  - Recommend taking medication earlier in the evening to avoid morning drowsiness.    OSTEOARTHRITIS OF KNEE:  - Ms. Moser reports arthritis in knees but is managing well.  - Reinforced benefits of current exercise routine, including stair climbing and walking, for knee health, bone density, and overall fitness.    MIGRAINE:  - Migraines are holding steady.    GENERAL HEALTH MANAGEMENT AND FOLLOW-UP:  - Ms. Moser to maintain current diet (avoiding fried foods, watching fatty foods and carbs) and continue drinking plenty of water.  - Ordered mammogram at [facility].  - Ms. Moser will send lab results for review when completed at work and contact office when mammogram is scheduled in September.         1. Annual physical exam    2. Essential hypertension    3. Hyperlipidemia, mixed    4. Anxiety    5. Migraine without status migrainosus, not intractable, unspecified migraine type    6. Primary insomnia    7. Primary osteoarthritis of both knees    8. Osteopenia, unspecified location    9. Screening mammogram, encounter for  - Mammo Digital Screening Bilat w/ Addi (XPD); Future  - Mammo Digital Screening Bilat w/ Addi (XPD)    10. GERD without esophagitis  - pantoprazole (PROTONIX) 40 MG tablet;  Take 1 tablet (40 mg total) by mouth once daily.  Dispense: 30 tablet; Refill: 3      Chronic conditions status updated as per HPI.  Other than changes above, cont current medications and maintain follow up with specialists.  No follow-ups on file.    Future Appointments   Date Time Provider Department Center   11/26/2025  3:45 PM Cielo Arreguin MD [facility]           Past Medical History:  Past Medical History:   Diagnosis Date    Hyperlipidemia     Hypertension        Past Surgical History:   has a past surgical history that includes colonosocpy (04/18/2017).    Family History:  family history includes Aneurysm in her father; No Known Problems in her brother and mother; Thyroid disease in her sister.     Social History:  Social History[1]    Medications:  Encounter Medications[2]    Allergies:  Review of patient's allergies indicates:   Allergen Reactions    Cefdinir Diarrhea and Nausea And Vomiting       Health Maintenance:  Immunization History   Administered Date(s) Administered    COVID-19, MRNA, LN-S, PF (Pfizer) (Purple Cap) 07/30/2021, 08/20/2021    COVID-19, mRNA, LNP-S, bivalent booster, PF (PFIZER OMICRON) 12/17/2022    Influenza 12/18/2014, 12/07/2015, 10/27/2016, 10/23/2019, 09/26/2020, 12/09/2022    Influenza - Quadrivalent - MDCK - PF 10/23/2019    Influenza - Quadrivalent - PF *Preferred* (6 months and older) 12/18/2014, 12/07/2015, 10/27/2016, 10/30/2017, 10/28/2018, 09/26/2020, 10/28/2021, 12/08/2022    Influenza - Trivalent - Fluarix, Flulaval, Fluzone, Afluria - PF 12/18/2014, 12/07/2015, 10/27/2016    Td (ADULT) 05/25/2018    Tdap 04/29/2021    Zoster Recombinant 08/03/2024      Health Maintenance   Topic Date Due    Pneumococcal Vaccines (Age 50+) (1 of 1 - PCV) Never done    COVID-19 Vaccine (4 - 2024-25 season) 09/01/2024    Mammogram  09/23/2025    Influenza Vaccine (1) 09/01/2025    DEXA Scan  01/26/2026    Colorectal Cancer Screening  04/18/2027    Lipid Panel  03/08/2029     "Cervical Cancer Screening  11/11/2029    TETANUS VACCINE  04/29/2031    RSV Vaccine (Age 60+ and Pregnant patients) (1 - 1-dose 75+ series) 04/16/2042    Hepatitis C Screening  Completed    Shingles Vaccine  Completed    HIV Screening  Completed        Physical Exam      Vital Signs  Pulse: 70  SpO2: 97 %  BP: 110/64  BP Location: Right arm  Patient Position: Sitting  Pain Score: 0-No pain  Height and Weight  Height: 5' 6" (167.6 cm)  Weight: 65 kg (143 lb 4.8 oz)  BSA (Calculated - sq m): 1.74 sq meters  BMI (Calculated): 23.1  Weight in (lb) to have BMI = 25: 154.6]    Physical Exam    Vitals: Blood pressure: 110/64.  General: No acute distress. Well-developed. Well-nourished.  Eyes: EOMI. Sclerae anicteric.  HENT: Normocephalic. Atraumatic. Nares patent. Moist oral mucosa.  Ears: Bilateral TMs clear. Bilateral EACs clear.  Cardiovascular: Regular rate. Regular rhythm. No murmurs. No rubs. No gallops. Normal S1, S2.  Respiratory: Normal respiratory effort. Clear to auscultation bilaterally. No rales. No rhonchi. No wheezing.  Abdomen: Soft. Non-tender. Non-distended. Normoactive bowel sounds.  Musculoskeletal: No  obvious deformity.  Extremities: No lower extremity edema.  Neurological: Alert & oriented x3. No slurred speech. Normal gait.  Psychiatric: Normal mood. Normal affect. Good insight. Good judgment.  Skin: Warm. Dry. No rash.         Physical Exam  Vitals reviewed.   Constitutional:       Appearance: She is well-developed.   HENT:      Head: Normocephalic and atraumatic.      Right Ear: External ear normal.      Left Ear: External ear normal.   Cardiovascular:      Rate and Rhythm: Normal rate and regular rhythm.      Heart sounds: Normal heart sounds. No murmur heard.  Pulmonary:      Effort: Pulmonary effort is normal.      Breath sounds: Normal breath sounds. No wheezing or rales.   Abdominal:      General: Bowel sounds are normal. There is no distension.      Palpations: Abdomen is soft.      " Tenderness: There is no abdominal tenderness.         Laboratory:    Results              CBC:  Recent Labs   Lab 07/07/23  0640 09/25/24  1443   WBC 9.5 8.53   RBC 4.88 4.92   Hemoglobin 14.3 14.8   Hematocrit 43.0 44.2   Platelets 356 292   MCV 88.1 90   MCH 29.3 30.1   MCHC 33.3 33.5     CMP:  Recent Labs   Lab 07/07/23  0640 11/24/23  1307 01/12/24  1118 09/25/24  1443   Glucose 93  --   --  118 H   Calcium 9.5 9.8   < > 9.7   Albumin 4.5 4.3   < > 4.1   Total Protein 6.9  --   --  7.6   Sodium 139 139  --  136   Potassium 4.0 3.9  --  4.5   CO2 30  --   --  24   Carbon Dioxide  --  26  --   --    Chloride 102  --   --  103   BUN 16 14.0  --  10   Alkaline Phosphatase  --   --   --  92   ALT 14 15  --  12   AST 15 19  --  22   Total Bilirubin 1.5 H 1.7 H  --  1.5 H    < > = values in this interval not displayed.     URINALYSIS:  Recent Labs   Lab 09/25/24  1448 09/25/24  1511   Color, POC UA Sheryl A  --    Color, UA  --  Orange A   Clarity, POC UA Clear  --    Spec Grav POC UA 1.025  --    Specific Gravity, UA  --  1.025   pH, POC UA 6.5  --    pH, UA  --  6.0   Protein, UA  --  Trace A   Bacteria  --  Occasional   Nitrite, POC UA Negative  --    Nitrite, UA  --  Negative   Leukocytes, UA  --  2+ A   Urobilinogen, POC UA 0.2  --       LIPIDS:  Recent Labs   Lab 07/07/23  0640 03/08/24  0000   TSH 1.56  --    HDL 76 78 A   Cholesterol 199 194   Triglycerides 142 104   LDL Cholesterol 99  --    LDL Calculated  --  96   HDL/Cholesterol Ratio 2.6  --    Non HDL Chol. (LDL+VLDL) 123  --      TSH:  Recent Labs   Lab 07/07/23  0640   TSH 1.56     A1C:            This note was generated with the assistance of ambient listening technology. Verbal consent was obtained by the patient and accompanying visitor(s) for the recording of patient appointment to facilitate this note. I attest to having reviewed and edited the generated note for accuracy, though some syntax or spelling errors may persist. Please contact the author  of this note for any clarification.      Clarke Almendarez MD  [facility]                       [1]   Social History  Tobacco Use    Smoking status: Never     Passive exposure: Never    Smokeless tobacco: Never   Substance Use Topics    Alcohol use: Yes     Alcohol/week: 1.0 standard drink of alcohol     Types: 1 Glasses of wine per week     Comment: Socially    Drug use: Never   [2]   Outpatient Encounter Medications as of 7/16/2025   Medication Sig Dispense Refill    amLODIPine (NORVASC) 5 MG tablet Take 1 tablet (5 mg total) by mouth once daily. 90 tablet 3    citalopram (CELEXA) 10 MG tablet Take 1 tablet (10 mg total) by mouth once daily. 90 tablet 1    ergocalciferol (ERGOCALCIFEROL) 50,000 unit Cap Take 1 capsule (50,000 Units total) by mouth every 30 days. 12 capsule 1    estradioL (ESTRACE) 0.01 % (0.1 mg/gram) vaginal cream PLACE PEA SIZED AMOUNT IN VAGINA (1G) DAILY X2 WEEKS THEN MWF      FLUCELVAX QUAD 1780-6853, PF, 60 mcg (15 mcg x 4)/0.5 mL Syrg TO BE ADMINISTERED BY PHARMACIST FOR IMMUNIZATION (Patient not taking: Reported on 11/11/2024)  0    fluticasone propionate (FLONASE) 50 mcg/actuation nasal spray 1 spray (50 mcg total) by Each Nostril route once daily. (Patient not taking: Reported on 11/11/2024) 9.9 mL 0    ondansetron (ZOFRAN-ODT) 8 MG TbDL Take 1 tablet (8 mg total) by mouth every 8 (eight) hours as needed (nause or vomiting). 12 tablet 0    pantoprazole (PROTONIX) 40 MG tablet Take 1 tablet (40 mg total) by mouth once daily. 30 tablet 3    pravastatin (PRAVACHOL) 20 MG tablet Take 1 tablet (20 mg total) by mouth once daily. 90 tablet 3    traZODone (DESYREL) 50 MG tablet        No facility-administered encounter medications on file as of 7/16/2025.

## 2025-07-21 DIAGNOSIS — M85.80 OSTEOPENIA, UNSPECIFIED LOCATION: ICD-10-CM

## 2025-07-21 RX ORDER — ERGOCALCIFEROL 1.25 MG/1
50000 CAPSULE ORAL
Qty: 12 CAPSULE | Refills: 1 | Status: SHIPPED | OUTPATIENT
Start: 2025-07-21

## 2025-07-21 NOTE — TELEPHONE ENCOUNTER
Care Due:                  Date            Visit Type   Department     Provider  --------------------------------------------------------------------------------                                EP -                              PRIMARY      [facility]  Last Visit: 07-      CARE ([facility])   KATHERYN Dooley  Next Visit: None Scheduled  None         None Found                                                            Last  Test          Frequency    Reason                     Performed    Due Date  --------------------------------------------------------------------------------    CMP.........  12 months..  ergocalciferol,            09- 09-                             pravastatin..............    Lipid Panel.  12 months..  pravastatin..............  03- 03-    Vitamin D...  12 months..  ergocalciferol...........  01- 01-    [employer] Embedded Care Due Messages. Reference number: [id number].   7/21/2025 9:38:09 AM CDT

## 2025-08-05 RX ORDER — PRAVASTATIN SODIUM 20 MG/1
TABLET ORAL
Qty: 90 TABLET | Refills: 3 | Status: SHIPPED | OUTPATIENT
Start: 2025-08-05

## 2025-08-05 NOTE — TELEPHONE ENCOUNTER
Refill Routing Note   Medication(s) are not appropriate for processing by [facility] for the following reason(s):        Required labs outdated    [facility] action(s):  Defer             Appointments  past 12m or future 3m with PCP    Date Provider   Last Visit   7/16/2025 Clarke Almendarez MD   Next Visit   Visit date not found Clarke Almendarez MD   ED visits in past 90 days: 0        Note composed:12:55 PM 08/05/2025

## 2025-08-05 NOTE — TELEPHONE ENCOUNTER
No care due was identified.  [facility] Embedded Care Due Messages. Reference number: [id number].   8/05/2025 3:26:32 AM CDT

## 2025-09-04 ENCOUNTER — PATIENT MESSAGE (OUTPATIENT)
Dept: PRIMARY CARE CLINIC | Facility: CLINIC | Age: 60
End: 2025-09-04
Payer: COMMERCIAL